# Patient Record
Sex: MALE | Race: OTHER | HISPANIC OR LATINO
[De-identification: names, ages, dates, MRNs, and addresses within clinical notes are randomized per-mention and may not be internally consistent; named-entity substitution may affect disease eponyms.]

---

## 2020-09-10 ENCOUNTER — APPOINTMENT (OUTPATIENT)
Dept: GASTROENTEROLOGY | Facility: CLINIC | Age: 40
End: 2020-09-10
Payer: COMMERCIAL

## 2020-09-10 VITALS
SYSTOLIC BLOOD PRESSURE: 140 MMHG | DIASTOLIC BLOOD PRESSURE: 80 MMHG | BODY MASS INDEX: 28.25 KG/M2 | OXYGEN SATURATION: 99 % | WEIGHT: 180 LBS | TEMPERATURE: 98.5 F | HEART RATE: 71 BPM | HEIGHT: 67 IN

## 2020-09-10 DIAGNOSIS — Z78.9 OTHER SPECIFIED HEALTH STATUS: ICD-10-CM

## 2020-09-10 DIAGNOSIS — Z72.89 OTHER PROBLEMS RELATED TO LIFESTYLE: ICD-10-CM

## 2020-09-10 PROCEDURE — 99204 OFFICE O/P NEW MOD 45 MIN: CPT

## 2020-09-10 NOTE — PHYSICAL EXAM
[General Appearance - Alert] : alert [Sclera] : the sclera and conjunctiva were normal [General Appearance - In No Acute Distress] : in no acute distress [Extraocular Movements] : extraocular movements were intact [PERRL With Normal Accommodation] : pupils were equal in size, round, and reactive to light [Outer Ear] : the ears and nose were normal in appearance [Oropharynx] : the oropharynx was normal [Neck Appearance] : the appearance of the neck was normal [Neck Cervical Mass (___cm)] : no neck mass was observed [Jugular Venous Distention Increased] : there was no jugular-venous distention [Thyroid Diffuse Enlargement] : the thyroid was not enlarged [Thyroid Nodule] : there were no palpable thyroid nodules [Auscultation Breath Sounds / Voice Sounds] : lungs were clear to auscultation bilaterally [Heart Rate And Rhythm] : heart rate was normal and rhythm regular [Heart Sounds] : normal S1 and S2 [Heart Sounds Gallop] : no gallops [Murmurs] : no murmurs [Heart Sounds Pericardial Friction Rub] : no pericardial rub [Soft, Nontender] : the abdomen was soft and nontender [Normal] : normal [Epigastric] : in the epigastric area [No Mass] : no masses were palpated [No HSM] : no hepatosplenomegaly noted [Skin Color & Pigmentation] : normal skin color and pigmentation [Skin Turgor] : normal skin turgor [] : no rash [Deep Tendon Reflexes (DTR)] : deep tendon reflexes were 2+ and symmetric [Sensation] : the sensory exam was normal to light touch and pinprick [Oriented To Time, Place, And Person] : oriented to person, place, and time [No Focal Deficits] : no focal deficits [Impaired Insight] : insight and judgment were intact [Affect] : the affect was normal

## 2020-09-10 NOTE — ASSESSMENT
[FreeTextEntry1] : 1. GERD\par \par Avoid spicy oily greasy foods\par \par Low acid diet \par \par PPI\par \par Wt control \par \par Exercise plan\par \par \par 2. BLOAT\par \par LOW FODMAP\par \par Fiber supp daily \par \par Increase fluids\par \par \par 3. GAS\par \par Avoid leafy vegetables\par \par GASEX PRN\par \par \par

## 2020-09-10 NOTE — HISTORY OF PRESENT ILLNESS
[de-identified] : Long GERD\par \par Poor Diet \par \par ? HP\par \par A low acid / reflux diet was discussed in great detail including  not smoking, not drinking alcohol, and not consuming foods that irritate the esophagus. It is helpful to eat small meals throughout the day instead of large meals. You should avoid eating before bedtime or lying down after you eat. It can be helpful to raise the head of your bed six inches. Additionally, you should maintain a healthy weight and good posture.. The patient was given written material to take home and review.\par

## 2020-11-05 DIAGNOSIS — Z00.00 ENCOUNTER FOR GENERAL ADULT MEDICAL EXAMINATION W/OUT ABNORMAL FINDINGS: ICD-10-CM

## 2020-11-09 ENCOUNTER — APPOINTMENT (OUTPATIENT)
Dept: GASTROENTEROLOGY | Facility: AMBULATORY MEDICAL SERVICES | Age: 40
End: 2020-11-09
Payer: COMMERCIAL

## 2020-11-09 PROCEDURE — 43239 EGD BIOPSY SINGLE/MULTIPLE: CPT

## 2020-12-03 ENCOUNTER — APPOINTMENT (OUTPATIENT)
Dept: GASTROENTEROLOGY | Facility: CLINIC | Age: 40
End: 2020-12-03
Payer: COMMERCIAL

## 2020-12-03 VITALS
TEMPERATURE: 98.7 F | WEIGHT: 180 LBS | HEIGHT: 67 IN | OXYGEN SATURATION: 99 % | SYSTOLIC BLOOD PRESSURE: 130 MMHG | BODY MASS INDEX: 28.25 KG/M2 | HEART RATE: 68 BPM | DIASTOLIC BLOOD PRESSURE: 80 MMHG

## 2020-12-03 PROCEDURE — 99214 OFFICE O/P EST MOD 30 MIN: CPT

## 2020-12-03 PROCEDURE — 99072 ADDL SUPL MATRL&STAF TM PHE: CPT

## 2021-03-25 ENCOUNTER — LABORATORY RESULT (OUTPATIENT)
Age: 41
End: 2021-03-25

## 2021-03-25 ENCOUNTER — APPOINTMENT (OUTPATIENT)
Dept: GASTROENTEROLOGY | Facility: CLINIC | Age: 41
End: 2021-03-25
Payer: COMMERCIAL

## 2021-03-25 PROCEDURE — 99072 ADDL SUPL MATRL&STAF TM PHE: CPT

## 2021-03-25 PROCEDURE — 99213 OFFICE O/P EST LOW 20 MIN: CPT | Mod: 25

## 2021-03-25 PROCEDURE — 83014 H PYLORI DRUG ADMIN: CPT

## 2021-03-25 NOTE — HISTORY OF PRESENT ILLNESS
[de-identified] : Post Rx \par \par Feels well \par \par No GERD \par \par Bloat \par \par For UBT

## 2021-05-24 ENCOUNTER — APPOINTMENT (OUTPATIENT)
Dept: GASTROENTEROLOGY | Facility: CLINIC | Age: 41
End: 2021-05-24
Payer: COMMERCIAL

## 2021-05-24 ENCOUNTER — LABORATORY RESULT (OUTPATIENT)
Age: 41
End: 2021-05-24

## 2021-05-24 PROCEDURE — 83014 H PYLORI DRUG ADMIN: CPT

## 2021-11-06 ENCOUNTER — EMERGENCY (EMERGENCY)
Facility: HOSPITAL | Age: 41
LOS: 1 days | Discharge: HOME | End: 2021-11-06
Attending: EMERGENCY MEDICINE | Admitting: EMERGENCY MEDICINE
Payer: COMMERCIAL

## 2021-11-06 VITALS
OXYGEN SATURATION: 98 % | DIASTOLIC BLOOD PRESSURE: 77 MMHG | RESPIRATION RATE: 15 BRPM | HEART RATE: 63 BPM | SYSTOLIC BLOOD PRESSURE: 130 MMHG | TEMPERATURE: 99 F

## 2021-11-06 VITALS
DIASTOLIC BLOOD PRESSURE: 86 MMHG | OXYGEN SATURATION: 99 % | TEMPERATURE: 99 F | HEART RATE: 85 BPM | RESPIRATION RATE: 17 BRPM | SYSTOLIC BLOOD PRESSURE: 147 MMHG

## 2021-11-06 LAB
ALBUMIN SERPL ELPH-MCNC: 4.7 G/DL — SIGNIFICANT CHANGE UP (ref 3.3–5)
ALP SERPL-CCNC: 95 U/L — SIGNIFICANT CHANGE UP (ref 40–120)
ALT FLD-CCNC: 51 U/L — HIGH (ref 4–41)
ANION GAP SERPL CALC-SCNC: 12 MMOL/L — SIGNIFICANT CHANGE UP (ref 7–14)
AST SERPL-CCNC: 29 U/L — SIGNIFICANT CHANGE UP (ref 4–40)
BILIRUB SERPL-MCNC: 0.8 MG/DL — SIGNIFICANT CHANGE UP (ref 0.2–1.2)
BUN SERPL-MCNC: 13 MG/DL — SIGNIFICANT CHANGE UP (ref 7–23)
CALCIUM SERPL-MCNC: 9.2 MG/DL — SIGNIFICANT CHANGE UP (ref 8.4–10.5)
CHLORIDE SERPL-SCNC: 102 MMOL/L — SIGNIFICANT CHANGE UP (ref 98–107)
CO2 SERPL-SCNC: 24 MMOL/L — SIGNIFICANT CHANGE UP (ref 22–31)
CREAT SERPL-MCNC: 0.77 MG/DL — SIGNIFICANT CHANGE UP (ref 0.5–1.3)
GLUCOSE SERPL-MCNC: 117 MG/DL — HIGH (ref 70–99)
HCT VFR BLD CALC: 43.5 % — SIGNIFICANT CHANGE UP (ref 39–50)
HGB BLD-MCNC: 15 G/DL — SIGNIFICANT CHANGE UP (ref 13–17)
MCHC RBC-ENTMCNC: 30.4 PG — SIGNIFICANT CHANGE UP (ref 27–34)
MCHC RBC-ENTMCNC: 34.5 GM/DL — SIGNIFICANT CHANGE UP (ref 32–36)
MCV RBC AUTO: 88.1 FL — SIGNIFICANT CHANGE UP (ref 80–100)
NRBC # BLD: 0 /100 WBCS — SIGNIFICANT CHANGE UP
NRBC # FLD: 0 K/UL — SIGNIFICANT CHANGE UP
PLATELET # BLD AUTO: 215 K/UL — SIGNIFICANT CHANGE UP (ref 150–400)
POTASSIUM SERPL-MCNC: 3.6 MMOL/L — SIGNIFICANT CHANGE UP (ref 3.5–5.3)
POTASSIUM SERPL-SCNC: 3.6 MMOL/L — SIGNIFICANT CHANGE UP (ref 3.5–5.3)
PROT SERPL-MCNC: 7.5 G/DL — SIGNIFICANT CHANGE UP (ref 6–8.3)
RBC # BLD: 4.94 M/UL — SIGNIFICANT CHANGE UP (ref 4.2–5.8)
RBC # FLD: 12.7 % — SIGNIFICANT CHANGE UP (ref 10.3–14.5)
SODIUM SERPL-SCNC: 138 MMOL/L — SIGNIFICANT CHANGE UP (ref 135–145)
WBC # BLD: 5.67 K/UL — SIGNIFICANT CHANGE UP (ref 3.8–10.5)
WBC # FLD AUTO: 5.67 K/UL — SIGNIFICANT CHANGE UP (ref 3.8–10.5)

## 2021-11-06 PROCEDURE — 99284 EMERGENCY DEPT VISIT MOD MDM: CPT

## 2021-11-06 RX ORDER — FAMOTIDINE 10 MG/ML
20 INJECTION INTRAVENOUS ONCE
Refills: 0 | Status: COMPLETED | OUTPATIENT
Start: 2021-11-06 | End: 2021-11-06

## 2021-11-06 RX ORDER — FAMOTIDINE 10 MG/ML
1 INJECTION INTRAVENOUS
Qty: 14 | Refills: 0
Start: 2021-11-06 | End: 2021-11-12

## 2021-11-06 RX ORDER — MECLIZINE HCL 12.5 MG
12.5 TABLET ORAL ONCE
Refills: 0 | Status: DISCONTINUED | OUTPATIENT
Start: 2021-11-06 | End: 2021-11-06

## 2021-11-06 RX ORDER — MECLIZINE HCL 12.5 MG
25 TABLET ORAL ONCE
Refills: 0 | Status: COMPLETED | OUTPATIENT
Start: 2021-11-06 | End: 2021-11-06

## 2021-11-06 RX ORDER — MECLIZINE HCL 12.5 MG
1 TABLET ORAL
Qty: 21 | Refills: 0
Start: 2021-11-06 | End: 2021-11-12

## 2021-11-06 RX ORDER — SODIUM CHLORIDE 9 MG/ML
1000 INJECTION INTRAMUSCULAR; INTRAVENOUS; SUBCUTANEOUS ONCE
Refills: 0 | Status: COMPLETED | OUTPATIENT
Start: 2021-11-06 | End: 2021-11-06

## 2021-11-06 RX ADMIN — Medication 30 MILLILITER(S): at 04:34

## 2021-11-06 RX ADMIN — FAMOTIDINE 20 MILLIGRAM(S): 10 INJECTION INTRAVENOUS at 04:34

## 2021-11-06 RX ADMIN — SODIUM CHLORIDE 1000 MILLILITER(S): 9 INJECTION INTRAMUSCULAR; INTRAVENOUS; SUBCUTANEOUS at 04:49

## 2021-11-06 RX ADMIN — Medication 25 MILLIGRAM(S): at 04:34

## 2021-11-06 NOTE — ED PROVIDER NOTE - ATTENDING CONTRIBUTION TO CARE
Attending Attestation: Dr. Juares  I have personally performed a history and physical examination of the patient and discussed management with the resident as well as the patient.  I reviewed the resident's note and agree with the documented findings and plan of care.  I have authored and modified critical sections of the Provider Note, including but not limited to HPI, Physical Exam and MDM. 39yo man with pmh h.pylori presenting with abdominal burning also with e/o vertigo, nystagmus on exam with otherwise normal neuro exam.  No ataxia or other findings. Abd burning suspicious for gastritis. No fever, no cough/SOB, decreased suspicion for lung pathology. Rash on back seems to be unrelated, possibly contact dermatitis. Cbc, cmp, maalox, pepcid, meclizine. Reassess.

## 2021-11-06 NOTE — ED ADULT NURSE NOTE - CHIEF COMPLAINT QUOTE
pt arrives w/ c/o waking up with abdominal burning and feeling hot. pt did not take his temp. pt states noticed rash to left side of back and thinks it is shingles. pt denies any vomiting, fever, cough, chills.

## 2021-11-06 NOTE — ED ADULT NURSE NOTE - OBJECTIVE STATEMENT
41 y/o male, a&ox4, ambulatory, english/Azeri speaking, received to rm 19 with complaint of burning sensation in abdomin and upper back. Pt reports burning sensation starting at 12am in the umbilical area of abdomen and a rash on back that started 10/26. Abdomin is non-distended, round, symmetrical, and pt denies pain during palpation. Rash on left upper back, approximately 1"x1.5", oval shaped, red, raised bumps, no exudate, and skin is intact. Pt reports hx of h. pylori, no medications at this time. VS noted. 39 y/o male, a&ox4, ambulatory, english/Frisian speaking, received to rm 19 with complaint of burning sensation in abdomin and upper back. Pt reports burning sensation starting at 12am in the umbilical area of abdomen and a rash on back that started 10/26. Abdomin is non-distended, round, symmetrical, and pt denies pain during palpation. Rash on left upper back, approximately 1"x1.5", oval shaped, red, raised bumps, no exudate, and skin is intact. Pt reports hx of h. pylori, no medications at this time. VS noted. 20G IV placed to left AC. Labs collected and sent off. Pt medicated as per MD orders.

## 2021-11-06 NOTE — ED PROVIDER NOTE - NSFOLLOWUPINSTRUCTIONS_ED_ALL_ED_FT
You were seen in the Emergency Room today because of abdominal burning. Your lab work did not show any infection. Your lab results are included in your discharge paperwork.     We have sent medication to your Pharmacy. They are:  -Pepcid: take one pill every 12 hours.   -Meclizine: take one pill every 8 hours if you feel dizzy.     Please follow-up with your Primary Care Physician for further management of your symptoms. Write down your questions so that you do not forget.     Please return to the Emergency Room if:   -You feel dizzy despite medication.   -You develop abdominal or chest pain.   -You develop fever that does not go away.   -You cannot stop vomiting.     ---      Hoy lo vieron en la nakul de emergencias debido a ardor abdominal. Bundy análisis de laboratorio no mostró ninguna infección. Felicia resultados de laboratorio están incluidos en bundy documentación de dickson.      Hemos enviado medicamentos a bundy farmacia. Son:   -Pepcid: anahy tyler pastilla cada 12 horas.   -Meclizina: tome tyler pastilla cada 8 horas si se siente mareado.      Suni un lazarus con bundy médico de atención primaria para un mayor control de felicia síntomas. Escribe tus preguntas para que no las olvides.      Regrese a la nakul de emergencias si:   -Se siente mareado a pesar de la medicación.   -Desarrolla dolor abdominal o en el pecho.   -Desarrolla fiebre que no desaparece.   -No puedes dejar de vomitar.

## 2021-11-06 NOTE — ED ADULT TRIAGE NOTE - CHIEF COMPLAINT QUOTE
pt arrives w/ c/o waking up with abdominal burning and feeling hot. pt did not take his temp. pt denies any vomiting, fever, cough, chills. pt arrives w/ c/o waking up with abdominal burning and feeling hot. pt did not take his temp. pt states noticed rash to left side of back and thinks it is shingles. pt denies any vomiting, fever, cough, chills.

## 2021-11-06 NOTE — ED PROVIDER NOTE - OBJECTIVE STATEMENT
41yo man with PMH H.pylori diagnosed 5 months ago and treated with antibiotics, presenting with diffuse abdominal and upper back burning. Patient states he awoke with these symptoms, also had lightheadedness and weakness at this time. Denies pain. No documented fever but endorses chills. No hx of ulcer. Not taking any medication at this time. Concerned that he may have shingles because of a rash on his left back. Denies N/V. 41yo man with PMH H.pylori diagnosed 5 months ago and treated with antibiotics, presenting with diffuse abdominal and upper back burning. Patient states he awoke with these symptoms, also had lightheadedness and weakness at this time. Denies pain. No documented fever but endorses chills. No hx of ulcer. Not taking any medication at this time. Concerned that he may have shingles because of a rash on his left back that has been there and has not changed for the last 11 days. Endorsing dizziness at this time. Denies N/V. 41yo man with PMH H.pylori diagnosed 5 months ago and treated with antibiotics, presenting with diffuse abdominal and upper back burning. Patient states he awoke with these symptoms, also had lightheadedness and weakness at this time. Denies pain. No documented fever but endorses chills. No hx of ulcer. Was taking omeprazole for 2 months but discontinued on his own. Not taking any medication at this time. Concerned that he may have shingles because of a rash on his left back that has been there and has not changed for the last 11 days. Endorsing dizziness at this time. Denies N/V. 39yo man with PMH H. pylori diagnosed 5 months ago and treated with antibiotics, presenting with diffuse abdominal and upper back burning, states he also feels burning throughout body. Patient states he awoke with these symptoms, also had lightheadedness and weakness at this time. Denies pain. No documented fever but endorses chills. No hx of ulcer. Was taking omeprazole for 2 months but discontinued on his own. Not taking any medication at this time. Concerned that he may have shingles because of a rash on his left back that has been there and has not changed for the last 11 days. Endorsing dizziness at this time. Denies N/V.

## 2021-11-06 NOTE — ED PROVIDER NOTE - PROGRESS NOTE DETAILS
John, PGY1 - Patient states he is no longer dizzy and burning sensation is to a minimum. John, PGY1 - Patient stable for discharge. Understands the Emergency Room work-up and discharge precautions.

## 2021-11-06 NOTE — ED PROVIDER NOTE - CLINICAL SUMMARY MEDICAL DECISION MAKING FREE TEXT BOX
John, PGY1 - 41yo man with pmh h.pylori presenting with abdominal burning. Suspicious for gastritis. No fever, no cough/SOB, decreased suspicion for lung pathology. Rash on back seems to be unrelated, possibly contact dermatitis. Cbc, cmp, maalox, pepcid. Reassess. 41yo man with pmh h.pylori presenting with abdominal burning also with e/o vertigo, nystagmus on exam with otherwise normal neuro exam.  No ataxia or other findings. Abd burning suspicious for gastritis. No fever, no cough/SOB, decreased suspicion for lung pathology. Rash on back seems to be unrelated, possibly contact dermatitis. Cbc, cmp, maalox, pepcid, meclizine. Reassess.

## 2021-11-06 NOTE — ED PROVIDER NOTE - PHYSICAL EXAMINATION
Gen: NAD, AOx3, able to make needs known, non-toxic  Head: NCAT  HEENT: EOMI, normal conjunctiva  Lung: no respiratory distress, speaking in full sentences  CV: RRR, no M/R/G, pulses bilaterally   Abd: soft, NTND, no guarding, no CVA tenderness  MSK: no visible deformities  Neuro: No focal sensory or motor deficits  Skin: Warm, well perfused, no rash  Psych: normal affect Gen: NAD, AOx3, able to make needs known, non-toxic  Head: NCAT  HEENT: EOMI, nystagmus on the left, normal conjunctiva  Lung: no respiratory distress, speaking in full sentences  CV: RRR, no M/R/G, pulses bilaterally   Abd: soft, NTND, no guarding, no CVA tenderness  MSK: no visible deformities, 1in x 2in herald patch-like maculopapular rash that is nontender.  Neuro: No focal sensory or motor deficits  Skin: Warm, well perfused, no rash  Psych: normal affect Gen: NAD, AOx3, able to make needs known, non-toxic  Head: NCAT  HEENT: EOMI, nystagmus to the left, normal conjunctiva  Lung: no respiratory distress, speaking in full sentences  CV: RRR, no M/R/G, pulses bilaterally   Abd: soft, NTND, no guarding, no CVA tenderness  MSK: no visible deformities, 1in x 2in herald patch-like maculopapular rash that is nontender.  Neuro: No focal sensory or motor deficits  Skin: Warm, well perfused, no rash  Psych: normal affect

## 2021-11-06 NOTE — ED PROVIDER NOTE - NS ED ROS FT
GENERAL: No fever, +chills  EYES: No change in vision  HEENT: No trouble swallowing or speaking  CARDIAC: No chest pain  PULMONARY: No cough, no SOB  GI: No abdominal pain, no nausea or no vomiting, no diarrhea, no constipation  : No changes in urination  SKIN: +back rash  NEURO: No headache, no numbness  MSK: No joint pain  Otherwise as HPI or negative.

## 2021-11-06 NOTE — ED PROVIDER NOTE - NEUROLOGICAL, MLM
Alert and oriented, no focal deficits, no motor or sensory deficits except for nystagmus on rightward gaze

## 2021-11-06 NOTE — ED PROVIDER NOTE - PATIENT PORTAL LINK FT
You can access the FollowMyHealth Patient Portal offered by [facility] by registering at the following website: [email or web address]. By joining [facility]’s FollowMyHealth portal, you will also be able to view your health information using other applications (apps) compatible with our system.

## 2021-11-06 NOTE — ED ADULT NURSE REASSESSMENT NOTE - NS ED NURSE REASSESS COMMENT FT1
Pt a&ox4, denies pain and burning sensation at the moment. Stable vs noted. IV discontinued, pt discharged.

## 2021-11-22 ENCOUNTER — APPOINTMENT (OUTPATIENT)
Dept: GASTROENTEROLOGY | Facility: CLINIC | Age: 41
End: 2021-11-22

## 2022-04-07 ENCOUNTER — APPOINTMENT (OUTPATIENT)
Dept: GASTROENTEROLOGY | Facility: CLINIC | Age: 42
End: 2022-04-07
Payer: COMMERCIAL

## 2022-04-07 VITALS
HEART RATE: 72 BPM | DIASTOLIC BLOOD PRESSURE: 81 MMHG | OXYGEN SATURATION: 97 % | HEIGHT: 67 IN | SYSTOLIC BLOOD PRESSURE: 130 MMHG | WEIGHT: 180 LBS | BODY MASS INDEX: 28.25 KG/M2 | TEMPERATURE: 98 F

## 2022-04-07 DIAGNOSIS — R12 HEARTBURN: ICD-10-CM

## 2022-04-07 DIAGNOSIS — R07.0 PAIN IN THROAT: ICD-10-CM

## 2022-04-07 DIAGNOSIS — R10.9 UNSPECIFIED ABDOMINAL PAIN: ICD-10-CM

## 2022-04-07 DIAGNOSIS — B96.81 GASTRITIS, UNSPECIFIED, W/OUT BLEEDING: ICD-10-CM

## 2022-04-07 DIAGNOSIS — K29.70 GASTRITIS, UNSPECIFIED, W/OUT BLEEDING: ICD-10-CM

## 2022-04-07 PROCEDURE — 99214 OFFICE O/P EST MOD 30 MIN: CPT

## 2022-04-07 NOTE — PHYSICAL EXAM
[General Appearance - Alert] : alert [General Appearance - In No Acute Distress] : in no acute distress [Sclera] : the sclera and conjunctiva were normal [PERRL With Normal Accommodation] : pupils were equal in size, round, and reactive to light [Extraocular Movements] : extraocular movements were intact [Outer Ear] : the ears and nose were normal in appearance [Oropharynx] : the oropharynx was normal [Neck Appearance] : the appearance of the neck was normal [Neck Cervical Mass (___cm)] : no neck mass was observed [Jugular Venous Distention Increased] : there was no jugular-venous distention [Thyroid Diffuse Enlargement] : the thyroid was not enlarged [Thyroid Nodule] : there were no palpable thyroid nodules [Auscultation Breath Sounds / Voice Sounds] : lungs were clear to auscultation bilaterally [Heart Rate And Rhythm] : heart rate was normal and rhythm regular [Heart Sounds] : normal S1 and S2 [Heart Sounds Gallop] : no gallops [Murmurs] : no murmurs [Heart Sounds Pericardial Friction Rub] : no pericardial rub [Epigastric] : in the epigastric area [Skin Color & Pigmentation] : normal skin color and pigmentation [Skin Turgor] : normal skin turgor [] : no rash [Deep Tendon Reflexes (DTR)] : deep tendon reflexes were 2+ and symmetric [Sensation] : the sensory exam was normal to light touch and pinprick [No Focal Deficits] : no focal deficits [Oriented To Time, Place, And Person] : oriented to person, place, and time [Impaired Insight] : insight and judgment were intact [Affect] : the affect was normal

## 2023-08-23 ENCOUNTER — EMERGENCY (EMERGENCY)
Facility: HOSPITAL | Age: 43
LOS: 1 days | Discharge: HOME | End: 2023-08-23
Attending: STUDENT IN AN ORGANIZED HEALTH CARE EDUCATION/TRAINING PROGRAM | Admitting: STUDENT IN AN ORGANIZED HEALTH CARE EDUCATION/TRAINING PROGRAM
Payer: COMMERCIAL

## 2023-08-23 VITALS
HEART RATE: 110 BPM | OXYGEN SATURATION: 100 % | TEMPERATURE: 100 F | SYSTOLIC BLOOD PRESSURE: 149 MMHG | RESPIRATION RATE: 18 BRPM | DIASTOLIC BLOOD PRESSURE: 98 MMHG

## 2023-08-23 VITALS
HEART RATE: 99 BPM | RESPIRATION RATE: 16 BRPM | OXYGEN SATURATION: 96 % | SYSTOLIC BLOOD PRESSURE: 137 MMHG | DIASTOLIC BLOOD PRESSURE: 89 MMHG

## 2023-08-23 LAB
B PERT DNA SPEC QL NAA+PROBE: SIGNIFICANT CHANGE UP
B PERT+PARAPERT DNA PNL SPEC NAA+PROBE: SIGNIFICANT CHANGE UP
BORDETELLA PARAPERTUSSIS (RAPRVP): SIGNIFICANT CHANGE UP
C PNEUM DNA SPEC QL NAA+PROBE: SIGNIFICANT CHANGE UP
FLUAV SUBTYP SPEC NAA+PROBE: SIGNIFICANT CHANGE UP
FLUBV RNA SPEC QL NAA+PROBE: SIGNIFICANT CHANGE UP
HADV DNA SPEC QL NAA+PROBE: SIGNIFICANT CHANGE UP
HCOV 229E RNA SPEC QL NAA+PROBE: SIGNIFICANT CHANGE UP
HCOV HKU1 RNA SPEC QL NAA+PROBE: SIGNIFICANT CHANGE UP
HCOV NL63 RNA SPEC QL NAA+PROBE: SIGNIFICANT CHANGE UP
HCOV OC43 RNA SPEC QL NAA+PROBE: SIGNIFICANT CHANGE UP
HMPV RNA SPEC QL NAA+PROBE: SIGNIFICANT CHANGE UP
HPIV1 RNA SPEC QL NAA+PROBE: SIGNIFICANT CHANGE UP
HPIV2 RNA SPEC QL NAA+PROBE: SIGNIFICANT CHANGE UP
HPIV3 RNA SPEC QL NAA+PROBE: SIGNIFICANT CHANGE UP
HPIV4 RNA SPEC QL NAA+PROBE: SIGNIFICANT CHANGE UP
M PNEUMO DNA SPEC QL NAA+PROBE: SIGNIFICANT CHANGE UP
RAPID RVP RESULT: DETECTED
RSV RNA SPEC QL NAA+PROBE: SIGNIFICANT CHANGE UP
RV+EV RNA SPEC QL NAA+PROBE: SIGNIFICANT CHANGE UP
SARS-COV-2 RNA SPEC QL NAA+PROBE: DETECTED

## 2023-08-23 PROCEDURE — 71046 X-RAY EXAM CHEST 2 VIEWS: CPT | Mod: 26

## 2023-08-23 PROCEDURE — 99284 EMERGENCY DEPT VISIT MOD MDM: CPT | Mod: 25

## 2023-08-23 RX ORDER — ACETAMINOPHEN 500 MG
975 TABLET ORAL ONCE
Refills: 0 | Status: COMPLETED | OUTPATIENT
Start: 2023-08-23 | End: 2023-08-23

## 2023-08-23 RX ORDER — ACETAMINOPHEN 500 MG
2 TABLET ORAL
Qty: 40 | Refills: 0
Start: 2023-08-23

## 2023-08-23 RX ORDER — IBUPROFEN 200 MG
600 TABLET ORAL ONCE
Refills: 0 | Status: COMPLETED | OUTPATIENT
Start: 2023-08-23 | End: 2023-08-23

## 2023-08-23 RX ORDER — IBUPROFEN 200 MG
1 TABLET ORAL
Qty: 30 | Refills: 0
Start: 2023-08-23 | End: 2023-09-01

## 2023-08-23 RX ORDER — ACETAMINOPHEN 500 MG
2 TABLET ORAL
Qty: 56 | Refills: 0
Start: 2023-08-23 | End: 2023-08-29

## 2023-08-23 RX ORDER — IBUPROFEN 200 MG
1 TABLET ORAL
Qty: 20 | Refills: 0
Start: 2023-08-23

## 2023-08-23 RX ADMIN — Medication 600 MILLIGRAM(S): at 08:30

## 2023-08-23 RX ADMIN — Medication 975 MILLIGRAM(S): at 07:45

## 2023-08-23 NOTE — ED PROVIDER NOTE - NSFOLLOWUPINSTRUCTIONS_ED_ALL_ED_FT
***You are seen in the emergency room for fatigue, body aches and congestion.  Your COVID test was positive.  Please maintain quarantine as discussed.  In addition to this your chest x-ray found small calcifications that were potentially on your liver versus your anterior sixth rib–please follow-up with your primary care doctor for this, in addition to this we have provided you with a referral and follow-up clinic information for gastroenterology for further testing.  Please return if any new, worsening, or concerning symptoms develop.  Please follow-up with your primary care doctor within 1 to 2 weeks. **    COVID-19  COVID-19, or coronavirus disease 2019, is an infection that is caused by a new (novel) coronavirus called SARS-CoV-2. COVID-19 can cause many symptoms. In some people, the virus may not cause any symptoms. In others, it may cause mild or severe symptoms. Some people with severe infection develop severe disease.    What are the causes?  The human body, showing how the coronavirus travels from the air to a person's lungs.  This illness is caused by a virus. The virus may be in the air as tiny specks of fluid (aerosols) or droplets, or it may be on surfaces. You may catch the virus by:  Breathing in droplets from an infected person. Droplets can be spread by a person breathing, speaking, singing, coughing, or sneezing.  Touching something, like a table or a doorknob, that has virus on it (is contaminated) and then touching your mouth, nose, or eyes.  What increases the risk?  Risk for infection:    You are more likely to get infected with the COVID-19 virus if:  You are within 6 ft (1.8 m) of a person with COVID-19 for 15 minutes or longer.  You are providing care for a person who is infected with COVID-19.  You are in close personal contact with other people. Close personal contact includes hugging, kissing, or sharing eating or drinking utensils.  Risk for serious illness caused by COVID-19:    You are more likely to get seriously ill from the COVID-19 virus if:  You have cancer.  You have a long-term (chronic) disease, such as:  Chronic lung disease. This includes pulmonary embolism, chronic obstructive pulmonary disease, and cystic fibrosis.  Long-term disease that lowers your body's ability to fight infection (immunocompromise).  Serious cardiac conditions, such as heart failure, coronary artery disease, or cardiomyopathy.  Diabetes.  Chronic kidney disease.  Liver diseases. These include cirrhosis, nonalcoholic fatty liver disease, alcoholic liver disease, or autoimmune hepatitis.  You have obesity.  You are pregnant or were recently pregnant.  You have sickle cell disease.  What are the signs or symptoms?  Symptoms of this condition can range from mild to severe. Symptoms may appear any time from 2 to 14 days after being exposed to the virus. They include:  Fever or chills.  Shortness of breath or trouble breathing.  Feeling tired or very tired.  Headaches, body aches, or muscle aches.  Runny or stuffy nose, sneezing, coughing, or sore throat.  New loss of taste or smell. This is rare.  Some people may also have stomach problems, such as nausea, vomiting, or diarrhea.    Other people may not have any symptoms of COVID-19.    How is this diagnosed?  A sample being collected by swabbing the nose.  This condition may be diagnosed by testing samples to check for the COVID-19 virus. The most common tests are the PCR test and the antigen test. Tests may be done in the lab or at home. They include:  Using a swab to take a sample of fluid from the back of your nose and throat (nasopharyngeal fluid), from your nose, or from your throat.  Testing a sample of saliva from your mouth.  Testing a sample of coughed-up mucus from your lungs (sputum).  How is this treated?  Treatment for COVID-19 infection depends on the severity of the condition.  Mild symptoms can be managed at home with rest, fluids, and over-the-counter medicines.  Serious symptoms may be treated in a hospital intensive care unit (ICU). Treatment in the ICU may include:  Supplemental oxygen. Extra oxygen is given through a tube in the nose, a face mask, or a rodriguez.  Medicines. These may include:  Antivirals, such as monoclonal antibodies. These help your body fight off certain viruses that can cause disease.  Anti-inflammatories, such as corticosteroids. These reduce inflammation and suppress the immune system.  Antithrombotics. These prevent or treat blood clots, if they develop.  Convalescent plasma. This helps boost your immune system, if you have an underlying immunosuppressive condition or are getting immunosuppressive treatments.  Prone positioning. This means you will lie on your stomach. This helps oxygen to get into your lungs.  Infection control measures.  If you are at risk for more serious illness caused by COVID-19, your health care provider may prescribe two long-acting monoclonal antibodies, given together every 6 months.    How is this prevented?  To protect yourself:    Use preventive medicine (pre-exposure prophylaxis). You may get pre-exposure prophylaxis if you have moderate or severe immunocompromise.  Get vaccinated. Anyone 6 months old or older who meets guidelines can get a COVID-19 vaccine or vaccine series. This includes people who are pregnant or making breast milk (lactating).  Get an added dose of COVID-19 vaccine after your first vaccine or vaccine series if you have moderate to severe immunocompromise. This applies if you have had a solid organ transplant or have been diagnosed with an immunocompromising condition.  You should get the added dose 4 weeks after you got the first COVID-19 vaccine or vaccine series.  If you get an mRNA vaccine, you will need a 3-dose primary series.  If you get the J&J/Zaynab vaccine, you will need a 2-dose primary series, with the second dose being an mRNA vaccine.  Talk to your health care provider about getting experimental monoclonal antibodies. This treatment is approved under emergency use authorization to prevent severe illness before or after being exposed to the COVID-19 virus. You may be given monoclonal antibodies if:  You have moderate or severe immunocompromise. This includes treatments that lower your immune response. People with immunocompromise may not develop protection against COVID-19 when they are vaccinated.  You cannot be vaccinated. You may not get a vaccine if you have a severe allergic reaction to the vaccine or its components.  You are not fully vaccinated.  You are in a facility where COVID-19 is present and:  Are in close contact with a person who is infected with the COVID-19 virus.  Are at high risk of being exposed to the COVID-19 virus.  You are at risk of illness from new variants of the COVID-19 virus.  To protect others:    If you have symptoms of COVID-19, take steps to prevent the virus from spreading to others.  Stay home. Leave your house only to get medical care. Do not use public transit, if possible.  Do not travel while you are sick.  Wash your hands often with soap and water for at least 20 seconds. If soap and water are not available, use alcohol-based hand .  Make sure that all people in your household wash their hands well and often.  Cough or sneeze into a tissue or your sleeve or elbow. Do not cough or sneeze into your hand or into the air.  Where to find more information  Centers for Disease Control and Prevention: www.cdc.gov/coronavirus  World Health Organization: www.who.int/health-topics/coronavirus  Get help right away if:  You have trouble breathing.  You have pain or pressure in your chest.  You are confused.  You have bluish lips and fingernails.  You have trouble waking from sleep.  You have symptoms that get worse.  These symptoms may be an emergency. Get help right away. Call 911.  Do not wait to see if the symptoms will go away.  Do not drive yourself to the hospital.  Summary  COVID-19 is an infection that is caused by a new coronavirus.  Sometimes, there are no symptoms. Other times, symptoms range from mild to severe. Some people with a severe COVID-19 infection develop severe disease.  The virus that causes COVID-19 can spread from person to person through droplets or aerosols from breathing, speaking, singing, coughing, or sneezing.  Mild symptoms of COVID-19 can be managed at home with rest, fluids, and over-the-counter medicines.  This information is not intended to replace advice given to you by your health care provider. Make sure you discuss any questions you have with your health care provider.

## 2023-08-23 NOTE — ED PROVIDER NOTE - NS ED ATTENDING STATEMENT MOD
This was a shared visit with the CYNDY. I reviewed and verified the documentation and independently performed the documented:

## 2023-08-23 NOTE — ED PROVIDER NOTE - CLINICAL SUMMARY MEDICAL DECISION MAKING FREE TEXT BOX
41 y/o M with HTN, hypercholesteremia, hx of Skip treated 2021 p/w fatigue, body aches, nasal congestion, and postnasal drip x 1 day    This patient presents with symptoms suspicious for likely viral upper respiratory infection.     Differential includes bacterial pneumonia, sinusitis, allergic rhinitis. Do not suspect underlying cardiopulmonary process. I considered, but think unlikely, dangerous causes of this patient’s symptoms to include ACS, CHF or COPD exacerbations, pneumonia, pneumothorax. Patient is nontoxic appearing and not in need of emergent medical intervention.    Plan: acetaminophen/ibuprofen, CXR, respiratory swab panel    Will likely provide reassurance, reassess, suggest over the counter medications, and discharge with PCP followup but will monitor.

## 2023-08-23 NOTE — ED PROVIDER NOTE - PROGRESS NOTE DETAILS
CODY hoffman: Patient endorses feeling significantly better and would like to go home.  Discussed with patient that chest x-ray is showing calcification that is either on his liver or possibly his right rib.  Advised to follow-up with primary care doctor but we will also provide follow-up with gastroenterology.  Discussed with patient that COVID result is positive–discussed quarantine and isolation protocol.  As pt is not immunocompromise and no SOB/wheezing or respiratory distress with risks of covid antiviral outweigh the benefit and will defer. Patient verbalized understanding and agrees with the plan.  Strict return precautions and prompt follow-up discussed.

## 2023-08-23 NOTE — ED PROVIDER NOTE - PATIENT PORTAL LINK FT
You can access the FollowMyHealth Patient Portal offered by [facility] by registering at the following website: [email or web address]. By joining [facility]’s FollowMyHealth portal, you will also be able to view your health information using other applications (apps) compatible with our system.

## 2023-08-23 NOTE — ED PROVIDER NOTE - NSFOLLOWUPCLINICS_GEN_ALL_ED_FT
[facility]  Gastroenterology  [address] [place] [address] [address]  [place] [address]  Phone: [phone]  Fax:

## 2023-08-23 NOTE — ED PROVIDER NOTE - OBJECTIVE STATEMENT
43 y/o M with HTN, hypercholesteremia, hx of Skip treated 2021 p/w fatigue, body aches, nasal congestion, and postnasal drip x 1 day    otc acetaminophen, last dose yesterday evening around 8 pm. also admits to mild dry cough  No recent travel or known ill contacts    Denies rigors, chest pain, shortness of breath, dyspnea on exertion, sore throat, neck stiffness, abd pain, nausea, vomiting, diarrhea, constipation, dysuria, hematuria, rash, syncope.

## 2023-08-23 NOTE — ED ADULT NURSE NOTE - OBJECTIVE STATEMENT
as per triage rn, "Patient c/o flu-like symptoms and headache [and body aches] x 1 day. Denies chest pain and SOB. NO hx.

## 2023-08-23 NOTE — ED PROVIDER NOTE - ATTENDING APP SHARED VISIT CONTRIBUTION OF CARE
I (Hardeep) agree with above, I performed a history and physical. Counseled gamaliel medical staff, physician assistant, and/or medical student on medical decision making as documented. Medical decisions and treatment interventions were made in real time during the patient encounter. Additionally and/or with the following exceptions: Patient is a 42-year-old male no past medical history presenting to the emergency department with 1 day of body aches, headache, sore throat, nasal congestion.  Patient was comfortable appearing, vital signs significant for mild tachycardia elevated temperature.  Patient in no respiratory distress no hypoxia.  Chest x-ray clear as per my interpretation.  Counseled patient on taking ibuprofen and acetaminophen for pain control as well as fever control suspect COVID or other viral etiology is most likely.  Patient verbalized understanding of need to follow-up COVID result.

## 2023-08-23 NOTE — ED PROVIDER NOTE - NSICDXFAMHXNEG_GEN_ED
Medical Weight Loss Multi-Disciplinary Program    Name: Elizabeth Rosales   : 1970    Session# 4  Date: 2018     Height: 5' 9\" (175.3 cm)    Weight: 147 kg (324 lb) lbs. Body mass index is 47.85 kg/m². Pounds Gained: 2    Dietary Instructions    Reviewed intake  Understanding low carbohydrates, low sugar, higher protein meals  Understanding proper portions  Instruction given for personal dietary changes  Discussed perceived compliance  Comments: Diet hx reviewed and personal dietary changes discussed. Diet hx: B-protein shake- Equate high performance shake with 1 gram of sugar- breakfast always, L-bowl of chili with cheese and a small package or ritz crackers, S-animal crakers, D- chili- vegetarian, cheese, ritz, S-no ice cream last night- wife is buying little ice cream cones- has about 4 times per week, had 2 cups of cashews last night, 1 cup of dark chocolate hot cocoa- 3 or 4 times per week     Hydration- 4-6 cups of fluid, Double Gulp Coca Cola     Physical Activity/Exercise    Discussed Perceived Compliance  Reasonable Goals Set  Motivation  Comments: Walking 3 times per week for 15 minutes (1/4 mile). He is limited due to pain. Goal to continue exercise routine of walking for 15 minutes, 3 times per week. Behavior Modification    Achieving/Rewarding goals met  Positive attitude  Discussed perceived compliance  Comments: Pt is exercising and plans to continue. He is using a protein shake for breakfast. His portions are very large- such as eating 2 cups (1800 calories worth) of cashews for PM snack. He is focusing on the following:     Goals:  1. Try to decrease portions of usual food by at least 20% this month. 2. Count out nuts- about 10 per day. 3. Continue exercise routine of walking for 15 minutes, 3 times per week. 4. Work on making sure to have at least 4 water bottles (64 oz) of water everyday.    5. Instead of Coke, try unsweet tea from [employer] with Equal.     Candidate for denies known hx surgery (per RD): YES, pt to finish with [facility] in Jan. 2019.      Dietitian: Heaven Rogers RD

## 2023-08-23 NOTE — ED PROVIDER NOTE - CCCP TRG CHIEF CMPLNT
flu-like symptoms Winlevi Counseling:  I discussed with the patient the risks of topical clascoterone including but not limited to erythema, scaling, itching, and stinging. Patient voiced their understanding.

## 2023-08-23 NOTE — ED ADULT NURSE NOTE - NSFALLUNIVINTERV_ED_ALL_ED
Bed/Stretcher in lowest position, wheels locked, appropriate side rails in place/Call bell, personal items and telephone in reach/Instruct patient to call for assistance before getting out of bed/chair/stretcher/Non-slip footwear applied when patient is off stretcher/[place] to call system/Physically safe environment - no spills, clutter or unnecessary equipment/Purposeful proactive rounding/Room/bathroom lighting operational, light cord in reach

## 2023-08-23 NOTE — ED PROVIDER NOTE - NSICDXPASTMEDICALHX_GEN_ALL_CORE_FT
PAST MEDICAL HISTORY:  H. pylori infection treated in 2021    Hypercholesteremia     Hypertension

## 2023-08-23 NOTE — ED PROVIDER NOTE - CARE PLAN
Principal Discharge DX:	Viral syndrome   1 Principal Discharge DX:	2019 novel coronavirus disease (COVID-19)

## 2023-12-12 PROBLEM — E78.00 PURE HYPERCHOLESTEROLEMIA, UNSPECIFIED: Chronic | Status: ACTIVE | Noted: 2023-08-23

## 2023-12-12 PROBLEM — A04.8 OTHER SPECIFIED BACTERIAL INTESTINAL INFECTIONS: Chronic | Status: ACTIVE | Noted: 2021-11-06

## 2023-12-12 PROBLEM — I10 ESSENTIAL (PRIMARY) HYPERTENSION: Chronic | Status: ACTIVE | Noted: 2023-08-23

## 2024-01-10 ENCOUNTER — APPOINTMENT (OUTPATIENT)
Dept: GASTROENTEROLOGY | Facility: CLINIC | Age: 44
End: 2024-01-10

## 2024-05-15 ENCOUNTER — APPOINTMENT (OUTPATIENT)
Dept: GASTROENTEROLOGY | Facility: CLINIC | Age: 44
End: 2024-05-15
Payer: COMMERCIAL

## 2024-05-15 VITALS
SYSTOLIC BLOOD PRESSURE: 139 MMHG | WEIGHT: 180 LBS | BODY MASS INDEX: 28.25 KG/M2 | HEART RATE: 65 BPM | HEIGHT: 67 IN | DIASTOLIC BLOOD PRESSURE: 84 MMHG | TEMPERATURE: 97.5 F | OXYGEN SATURATION: 97 %

## 2024-05-15 DIAGNOSIS — Z12.11 ENCOUNTER FOR SCREENING FOR MALIGNANT NEOPLASM OF COLON: ICD-10-CM

## 2024-05-15 DIAGNOSIS — Z80.8 FAMILY HISTORY OF MALIGNANT NEOPLASM OF OTHER ORGANS OR SYSTEMS: ICD-10-CM

## 2024-05-15 DIAGNOSIS — K21.9 GASTRO-ESOPHAGEAL REFLUX DISEASE W/OUT ESOPHAGITIS: ICD-10-CM

## 2024-05-15 PROCEDURE — 99214 OFFICE O/P EST MOD 30 MIN: CPT

## 2024-05-15 RX ORDER — TETRACYCLINE HYDROCHLORIDE 500 MG/1
500 CAPSULE ORAL EVERY 6 HOURS
Qty: 40 | Refills: 0 | Status: DISCONTINUED | COMMUNITY
Start: 2021-03-31 | End: 2024-05-15

## 2024-05-15 RX ORDER — LORATADINE 10 MG/1
10 TABLET ORAL
Refills: 0 | Status: DISCONTINUED | COMMUNITY
End: 2024-05-15

## 2024-05-15 RX ORDER — AMOXICILLIN 500 MG/1
500 TABLET, FILM COATED ORAL
Qty: 40 | Refills: 0 | Status: DISCONTINUED | COMMUNITY
Start: 2020-12-03 | End: 2024-05-15

## 2024-05-15 RX ORDER — OMEPRAZOLE 40 MG/1
40 CAPSULE, DELAYED RELEASE ORAL
Refills: 0 | Status: ACTIVE | COMMUNITY

## 2024-05-15 RX ORDER — OMEPRAZOLE 20 MG/1
20 CAPSULE, DELAYED RELEASE ORAL TWICE DAILY
Qty: 20 | Refills: 0 | Status: DISCONTINUED | COMMUNITY
Start: 2021-03-31 | End: 2024-05-15

## 2024-05-15 RX ORDER — CLARITHROMYCIN 250 MG/1
250 TABLET, FILM COATED ORAL
Qty: 20 | Refills: 0 | Status: DISCONTINUED | COMMUNITY
Start: 2020-12-03 | End: 2024-05-15

## 2024-05-15 RX ORDER — OMEPRAZOLE 40 MG/1
40 CAPSULE, DELAYED RELEASE ORAL
Qty: 30 | Refills: 5 | Status: DISCONTINUED | COMMUNITY
Start: 2022-04-07 | End: 2024-05-15

## 2024-05-15 RX ORDER — SODIUM SULFATE, POTASSIUM SULFATE AND MAGNESIUM SULFATE 1.6; 3.13; 17.5 G/177ML; G/177ML; G/177ML
17.5-3.13-1.6 SOLUTION ORAL
Qty: 1 | Refills: 0 | Status: ACTIVE | COMMUNITY
Start: 2024-05-15 | End: 1900-01-01

## 2024-05-15 RX ORDER — OMEPRAZOLE 40 MG/1
40 CAPSULE, DELAYED RELEASE ORAL
Qty: 30 | Refills: 5 | Status: DISCONTINUED | COMMUNITY
Start: 2020-09-10 | End: 2024-05-15

## 2024-05-15 RX ORDER — OMEPRAZOLE 20 MG/1
20 CAPSULE, DELAYED RELEASE ORAL
Refills: 0 | Status: DISCONTINUED | COMMUNITY
End: 2024-05-15

## 2024-05-15 RX ORDER — METRONIDAZOLE 500 MG/1
500 TABLET ORAL 3 TIMES DAILY
Qty: 30 | Refills: 0 | Status: DISCONTINUED | COMMUNITY
Start: 2021-03-31 | End: 2024-05-15

## 2024-05-15 RX ORDER — BISMUTH SUBSALICYLATE 262 MG/1
262 TABLET, CHEWABLE ORAL 4 TIMES DAILY
Qty: 80 | Refills: 0 | Status: DISCONTINUED | COMMUNITY
Start: 2021-03-31 | End: 2024-05-15

## 2024-05-15 RX ORDER — OMEPRAZOLE 40 MG/1
40 CAPSULE, DELAYED RELEASE ORAL TWICE DAILY
Qty: 20 | Refills: 5 | Status: DISCONTINUED | COMMUNITY
Start: 2020-12-03 | End: 2024-05-15

## 2024-05-15 NOTE — HISTORY OF PRESENT ILLNESS
[FreeTextEntry1] : Patient is a 43-year-old gentleman who is referred for a screening colonoscopy.  This would be his first.  His bowel movements are regular.  He denies seeing any blood or mucus in the stool. He does have a history of chronic GERD and takes omeprazole 40 mg daily.  He had an upper endoscopy in 2020 that revealed a hiatus hernia and H. pylori gastritis.  The H. pylori was treated and eradicated.  He has developed a recurrence of his heartburn and some regurgitation.  This occurs especially after eating certain foods.  He has been off omeprazole recently.  He denies any dysphagia or early satiety.

## 2024-05-15 NOTE — ASSESSMENT
[FreeTextEntry1] : Patient is referred for a screening colonoscopy which will be scheduled. He also has a history of chronic GERD.  He will be restarted on omeprazole 40 mg daily.  He does have a history of H. pylori gastritis which was treated and eradicated.

## 2024-05-15 NOTE — PHYSICAL EXAM
[Alert] : alert [Normal Voice/Communication] : normal voice/communication [Healthy Appearing] : healthy appearing [No Acute Distress] : no acute distress [Sclera] : the sclera and conjunctiva were normal [Hearing Threshold Finger Rub Not [place]] : hearing was normal [Normal Lips/Gums] : the lips and gums were normal [Oropharynx] : the oropharynx was normal [Normal Appearance] : the appearance of the neck was normal [No Neck Mass] : no neck mass was observed [No Respiratory Distress] : no respiratory distress [No Acc Muscle Use] : no accessory muscle use [Respiration, Rhythm And Depth] : normal respiratory rhythm and effort [Auscultation Breath Sounds / Voice Sounds] : lungs were clear to auscultation bilaterally [Heart Rate And Rhythm] : heart rate was normal and rhythm regular [Normal S1, S2] : normal S1 and S2 [Murmurs] : no murmurs [Bowel Sounds] : normal bowel sounds [Abdomen Tenderness] : non-tender [No Masses] : no abdominal mass palpated [Abdomen Soft] : soft [] : no hepatosplenomegaly [Oriented To Time, Place, And Person] : oriented to person, place, and time

## 2024-05-15 NOTE — REASON FOR VISIT
[Follow-up] : a follow-up of an existing diagnosis [FreeTextEntry1] : Screening colonoscopy, Chronic GERD

## 2024-07-02 ENCOUNTER — EMERGENCY (EMERGENCY)
Facility: HOSPITAL | Age: 44
LOS: 1 days | Discharge: HOME | End: 2024-07-02
Admitting: EMERGENCY MEDICINE
Payer: COMMERCIAL

## 2024-07-02 VITALS
OXYGEN SATURATION: 100 % | HEIGHT: 66 IN | HEART RATE: 78 BPM | TEMPERATURE: 100 F | SYSTOLIC BLOOD PRESSURE: 133 MMHG | RESPIRATION RATE: 18 BRPM | DIASTOLIC BLOOD PRESSURE: 67 MMHG | WEIGHT: 179.9 LBS

## 2024-07-02 LAB
ALBUMIN SERPL ELPH-MCNC: 4.1 G/DL — SIGNIFICANT CHANGE UP (ref 3.3–5)
ALP SERPL-CCNC: 90 U/L — SIGNIFICANT CHANGE UP (ref 40–120)
ALT FLD-CCNC: 38 U/L — SIGNIFICANT CHANGE UP (ref 4–41)
ANION GAP SERPL CALC-SCNC: 13 MMOL/L — SIGNIFICANT CHANGE UP (ref 7–14)
AST SERPL-CCNC: 25 U/L — SIGNIFICANT CHANGE UP (ref 4–40)
BASOPHILS # BLD AUTO: 0.02 K/UL — SIGNIFICANT CHANGE UP (ref 0–0.2)
BASOPHILS NFR BLD AUTO: 0.4 % — SIGNIFICANT CHANGE UP (ref 0–2)
BILIRUB SERPL-MCNC: 0.5 MG/DL — SIGNIFICANT CHANGE UP (ref 0.2–1.2)
BUN SERPL-MCNC: 10 MG/DL — SIGNIFICANT CHANGE UP (ref 7–23)
CALCIUM SERPL-MCNC: 9 MG/DL — SIGNIFICANT CHANGE UP (ref 8.4–10.5)
CHLORIDE SERPL-SCNC: 101 MMOL/L — SIGNIFICANT CHANGE UP (ref 98–107)
CO2 SERPL-SCNC: 20 MMOL/L — LOW (ref 22–31)
CREAT SERPL-MCNC: 0.78 MG/DL — SIGNIFICANT CHANGE UP (ref 0.5–1.3)
EGFR: 112 ML/MIN/1.73M2 — SIGNIFICANT CHANGE UP
EOSINOPHIL # BLD AUTO: 0.05 K/UL — SIGNIFICANT CHANGE UP (ref 0–0.5)
EOSINOPHIL NFR BLD AUTO: 1.1 % — SIGNIFICANT CHANGE UP (ref 0–6)
FLUAV AG NPH QL: SIGNIFICANT CHANGE UP
FLUBV AG NPH QL: SIGNIFICANT CHANGE UP
GLUCOSE SERPL-MCNC: 106 MG/DL — HIGH (ref 70–99)
HCT VFR BLD CALC: 41.2 % — SIGNIFICANT CHANGE UP (ref 39–50)
HGB BLD-MCNC: 14.9 G/DL — SIGNIFICANT CHANGE UP (ref 13–17)
IANC: 3.04 K/UL — SIGNIFICANT CHANGE UP (ref 1.8–7.4)
IMM GRANULOCYTES NFR BLD AUTO: 0.2 % — SIGNIFICANT CHANGE UP (ref 0–0.9)
LIDOCAIN IGE QN: 34 U/L — SIGNIFICANT CHANGE UP (ref 7–60)
LYMPHOCYTES # BLD AUTO: 0.77 K/UL — LOW (ref 1–3.3)
LYMPHOCYTES # BLD AUTO: 17.1 % — SIGNIFICANT CHANGE UP (ref 13–44)
MCHC RBC-ENTMCNC: 30.4 PG — SIGNIFICANT CHANGE UP (ref 27–34)
MCHC RBC-ENTMCNC: 36.2 GM/DL — HIGH (ref 32–36)
MCV RBC AUTO: 84.1 FL — SIGNIFICANT CHANGE UP (ref 80–100)
MONOCYTES # BLD AUTO: 0.61 K/UL — SIGNIFICANT CHANGE UP (ref 0–0.9)
MONOCYTES NFR BLD AUTO: 13.6 % — SIGNIFICANT CHANGE UP (ref 2–14)
NEUTROPHILS # BLD AUTO: 3.04 K/UL — SIGNIFICANT CHANGE UP (ref 1.8–7.4)
NEUTROPHILS NFR BLD AUTO: 67.6 % — SIGNIFICANT CHANGE UP (ref 43–77)
NRBC # BLD: 0 /100 WBCS — SIGNIFICANT CHANGE UP (ref 0–0)
NRBC # FLD: 0 K/UL — SIGNIFICANT CHANGE UP (ref 0–0)
PLATELET # BLD AUTO: 165 K/UL — SIGNIFICANT CHANGE UP (ref 150–400)
POTASSIUM SERPL-MCNC: 3.6 MMOL/L — SIGNIFICANT CHANGE UP (ref 3.5–5.3)
POTASSIUM SERPL-SCNC: 3.6 MMOL/L — SIGNIFICANT CHANGE UP (ref 3.5–5.3)
PROT SERPL-MCNC: 7.4 G/DL — SIGNIFICANT CHANGE UP (ref 6–8.3)
RBC # BLD: 4.9 M/UL — SIGNIFICANT CHANGE UP (ref 4.2–5.8)
RBC # FLD: 13.2 % — SIGNIFICANT CHANGE UP (ref 10.3–14.5)
RSV RNA NPH QL NAA+NON-PROBE: SIGNIFICANT CHANGE UP
SARS-COV-2 RNA SPEC QL NAA+PROBE: SIGNIFICANT CHANGE UP
SODIUM SERPL-SCNC: 134 MMOL/L — LOW (ref 135–145)
WBC # BLD: 4.5 K/UL — SIGNIFICANT CHANGE UP (ref 3.8–10.5)
WBC # FLD AUTO: 4.5 K/UL — SIGNIFICANT CHANGE UP (ref 3.8–10.5)

## 2024-07-02 PROCEDURE — 99284 EMERGENCY DEPT VISIT MOD MDM: CPT

## 2024-07-02 RX ORDER — ONDANSETRON HYDROCHLORIDE 2 MG/ML
4 INJECTION INTRAMUSCULAR; INTRAVENOUS ONCE
Refills: 0 | Status: COMPLETED | OUTPATIENT
Start: 2024-07-02 | End: 2024-07-02

## 2024-07-02 RX ORDER — SODIUM CHLORIDE 0.9 % (FLUSH) 0.9 %
1000 SYRINGE (ML) INJECTION ONCE
Refills: 0 | Status: COMPLETED | OUTPATIENT
Start: 2024-07-02 | End: 2024-07-02

## 2024-07-02 RX ORDER — FAMOTIDINE 40 MG
20 TABLET ORAL ONCE
Refills: 0 | Status: COMPLETED | OUTPATIENT
Start: 2024-07-02 | End: 2024-07-02

## 2024-07-02 RX ADMIN — ONDANSETRON HYDROCHLORIDE 4 MILLIGRAM(S): 2 INJECTION INTRAMUSCULAR; INTRAVENOUS at 11:20

## 2024-07-02 RX ADMIN — Medication 1000 MILLILITER(S): at 11:20

## 2024-07-02 RX ADMIN — Medication 20 MILLIGRAM(S): at 11:20

## 2024-07-02 RX ADMIN — Medication 1000 MILLILITER(S): at 12:43

## 2024-07-03 ENCOUNTER — EMERGENCY (EMERGENCY)
Facility: HOSPITAL | Age: 44
LOS: 1 days | Discharge: HOME | End: 2024-07-03
Attending: EMERGENCY MEDICINE | Admitting: EMERGENCY MEDICINE
Payer: COMMERCIAL

## 2024-07-03 ENCOUNTER — EMERGENCY (EMERGENCY)
Facility: HOSPITAL | Age: 44
LOS: 1 days | Discharge: HOME | End: 2024-07-03
Attending: STUDENT IN AN ORGANIZED HEALTH CARE EDUCATION/TRAINING PROGRAM | Admitting: STUDENT IN AN ORGANIZED HEALTH CARE EDUCATION/TRAINING PROGRAM
Payer: COMMERCIAL

## 2024-07-03 VITALS
TEMPERATURE: 98 F | WEIGHT: 179.9 LBS | SYSTOLIC BLOOD PRESSURE: 141 MMHG | RESPIRATION RATE: 18 BRPM | HEART RATE: 67 BPM | HEIGHT: 66 IN | DIASTOLIC BLOOD PRESSURE: 82 MMHG | OXYGEN SATURATION: 99 %

## 2024-07-03 VITALS
RESPIRATION RATE: 16 BRPM | HEIGHT: 66 IN | OXYGEN SATURATION: 100 % | TEMPERATURE: 98 F | SYSTOLIC BLOOD PRESSURE: 154 MMHG | DIASTOLIC BLOOD PRESSURE: 81 MMHG | HEART RATE: 79 BPM

## 2024-07-03 LAB
ALBUMIN SERPL ELPH-MCNC: 4.1 G/DL — SIGNIFICANT CHANGE UP (ref 3.3–5)
ALBUMIN SERPL ELPH-MCNC: 4.3 G/DL — SIGNIFICANT CHANGE UP (ref 3.3–5)
ALP SERPL-CCNC: 90 U/L — SIGNIFICANT CHANGE UP (ref 40–120)
ALP SERPL-CCNC: 99 U/L — SIGNIFICANT CHANGE UP (ref 40–120)
ALT FLD-CCNC: 38 U/L — SIGNIFICANT CHANGE UP (ref 4–41)
ALT FLD-CCNC: 42 U/L — HIGH (ref 4–41)
ANION GAP SERPL CALC-SCNC: 14 MMOL/L — SIGNIFICANT CHANGE UP (ref 7–14)
ANION GAP SERPL CALC-SCNC: 14 MMOL/L — SIGNIFICANT CHANGE UP (ref 7–14)
APPEARANCE UR: CLEAR — SIGNIFICANT CHANGE UP
APTT BLD: 32.2 SEC — SIGNIFICANT CHANGE UP (ref 24.5–35.6)
AST SERPL-CCNC: 27 U/L — SIGNIFICANT CHANGE UP (ref 4–40)
AST SERPL-CCNC: 28 U/L — SIGNIFICANT CHANGE UP (ref 4–40)
BASOPHILS # BLD AUTO: 0.03 K/UL — SIGNIFICANT CHANGE UP (ref 0–0.2)
BASOPHILS # BLD AUTO: 0.03 K/UL — SIGNIFICANT CHANGE UP (ref 0–0.2)
BASOPHILS NFR BLD AUTO: 0.6 % — SIGNIFICANT CHANGE UP (ref 0–2)
BASOPHILS NFR BLD AUTO: 0.7 % — SIGNIFICANT CHANGE UP (ref 0–2)
BILIRUB SERPL-MCNC: 0.4 MG/DL — SIGNIFICANT CHANGE UP (ref 0.2–1.2)
BILIRUB SERPL-MCNC: 0.5 MG/DL — SIGNIFICANT CHANGE UP (ref 0.2–1.2)
BILIRUB UR-MCNC: NEGATIVE — SIGNIFICANT CHANGE UP
BLD GP AB SCN SERPL QL: NEGATIVE — SIGNIFICANT CHANGE UP
BUN SERPL-MCNC: 10 MG/DL — SIGNIFICANT CHANGE UP (ref 7–23)
BUN SERPL-MCNC: 7 MG/DL — SIGNIFICANT CHANGE UP (ref 7–23)
CALCIUM SERPL-MCNC: 8.7 MG/DL — SIGNIFICANT CHANGE UP (ref 8.4–10.5)
CALCIUM SERPL-MCNC: 8.8 MG/DL — SIGNIFICANT CHANGE UP (ref 8.4–10.5)
CHLORIDE SERPL-SCNC: 104 MMOL/L — SIGNIFICANT CHANGE UP (ref 98–107)
CHLORIDE SERPL-SCNC: 104 MMOL/L — SIGNIFICANT CHANGE UP (ref 98–107)
CO2 SERPL-SCNC: 19 MMOL/L — LOW (ref 22–31)
CO2 SERPL-SCNC: 20 MMOL/L — LOW (ref 22–31)
COLOR SPEC: YELLOW — SIGNIFICANT CHANGE UP
CREAT SERPL-MCNC: 0.79 MG/DL — SIGNIFICANT CHANGE UP (ref 0.5–1.3)
CREAT SERPL-MCNC: 0.92 MG/DL — SIGNIFICANT CHANGE UP (ref 0.5–1.3)
DIFF PNL FLD: NEGATIVE — SIGNIFICANT CHANGE UP
EGFR: 105 ML/MIN/1.73M2 — SIGNIFICANT CHANGE UP
EGFR: 105 ML/MIN/1.73M2 — SIGNIFICANT CHANGE UP
EGFR: 112 ML/MIN/1.73M2 — SIGNIFICANT CHANGE UP
EOSINOPHIL # BLD AUTO: 0.02 K/UL — SIGNIFICANT CHANGE UP (ref 0–0.5)
EOSINOPHIL # BLD AUTO: 0.06 K/UL — SIGNIFICANT CHANGE UP (ref 0–0.5)
EOSINOPHIL NFR BLD AUTO: 0.4 % — SIGNIFICANT CHANGE UP (ref 0–6)
EOSINOPHIL NFR BLD AUTO: 1.3 % — SIGNIFICANT CHANGE UP (ref 0–6)
GAS PNL BLDV: SIGNIFICANT CHANGE UP
GLUCOSE SERPL-MCNC: 104 MG/DL — HIGH (ref 70–99)
GLUCOSE SERPL-MCNC: 111 MG/DL — HIGH (ref 70–99)
GLUCOSE UR QL: NEGATIVE MG/DL — SIGNIFICANT CHANGE UP
HCT VFR BLD CALC: 41.2 % — SIGNIFICANT CHANGE UP (ref 39–50)
HCT VFR BLD CALC: 42.6 % — SIGNIFICANT CHANGE UP (ref 39–50)
HGB BLD-MCNC: 14.1 G/DL — SIGNIFICANT CHANGE UP (ref 13–17)
HGB BLD-MCNC: 14.4 G/DL — SIGNIFICANT CHANGE UP (ref 13–17)
IANC: 2.51 K/UL — SIGNIFICANT CHANGE UP (ref 1.8–7.4)
IANC: 3.44 K/UL — SIGNIFICANT CHANGE UP (ref 1.8–7.4)
IMM GRANULOCYTES NFR BLD AUTO: 0.2 % — SIGNIFICANT CHANGE UP (ref 0–0.9)
IMM GRANULOCYTES NFR BLD AUTO: 0.2 % — SIGNIFICANT CHANGE UP (ref 0–0.9)
INR BLD: 1.07 RATIO — SIGNIFICANT CHANGE UP (ref 0.85–1.18)
KETONES UR-MCNC: NEGATIVE MG/DL — SIGNIFICANT CHANGE UP
LEUKOCYTE ESTERASE UR-ACNC: NEGATIVE — SIGNIFICANT CHANGE UP
LIDOCAIN IGE QN: 34 U/L — SIGNIFICANT CHANGE UP (ref 7–60)
LIDOCAIN IGE QN: 36 U/L — SIGNIFICANT CHANGE UP (ref 7–60)
LYMPHOCYTES # BLD AUTO: 0.81 K/UL — LOW (ref 1–3.3)
LYMPHOCYTES # BLD AUTO: 1.17 K/UL — SIGNIFICANT CHANGE UP (ref 1–3.3)
LYMPHOCYTES # BLD AUTO: 16.4 % — SIGNIFICANT CHANGE UP (ref 13–44)
LYMPHOCYTES # BLD AUTO: 25.8 % — SIGNIFICANT CHANGE UP (ref 13–44)
MCHC RBC-ENTMCNC: 29.2 PG — SIGNIFICANT CHANGE UP (ref 27–34)
MCHC RBC-ENTMCNC: 30 PG — SIGNIFICANT CHANGE UP (ref 27–34)
MCHC RBC-ENTMCNC: 33.8 GM/DL — SIGNIFICANT CHANGE UP (ref 32–36)
MCHC RBC-ENTMCNC: 34.2 GM/DL — SIGNIFICANT CHANGE UP (ref 32–36)
MCV RBC AUTO: 86.4 FL — SIGNIFICANT CHANGE UP (ref 80–100)
MCV RBC AUTO: 87.7 FL — SIGNIFICANT CHANGE UP (ref 80–100)
MONOCYTES # BLD AUTO: 0.62 K/UL — SIGNIFICANT CHANGE UP (ref 0–0.9)
MONOCYTES # BLD AUTO: 0.75 K/UL — SIGNIFICANT CHANGE UP (ref 0–0.9)
MONOCYTES NFR BLD AUTO: 12.6 % — SIGNIFICANT CHANGE UP (ref 2–14)
MONOCYTES NFR BLD AUTO: 16.6 % — HIGH (ref 2–14)
NEUTROPHILS # BLD AUTO: 2.51 K/UL — SIGNIFICANT CHANGE UP (ref 1.8–7.4)
NEUTROPHILS # BLD AUTO: 3.44 K/UL — SIGNIFICANT CHANGE UP (ref 1.8–7.4)
NEUTROPHILS NFR BLD AUTO: 55.4 % — SIGNIFICANT CHANGE UP (ref 43–77)
NEUTROPHILS NFR BLD AUTO: 69.8 % — SIGNIFICANT CHANGE UP (ref 43–77)
NITRITE UR-MCNC: NEGATIVE — SIGNIFICANT CHANGE UP
NRBC # BLD AUTO: 0 K/UL — SIGNIFICANT CHANGE UP (ref 0–0)
NRBC # BLD: 0 /100 WBCS — SIGNIFICANT CHANGE UP (ref 0–0)
NRBC # BLD: 0 /100 WBCS — SIGNIFICANT CHANGE UP (ref 0–0)
NRBC # FLD: 0 K/UL — SIGNIFICANT CHANGE UP (ref 0–0)
NRBC # FLD: 0 K/UL — SIGNIFICANT CHANGE UP (ref 0–0)
NRBC BLD-RTO: 0 /100 WBCS — SIGNIFICANT CHANGE UP (ref 0–0)
PH UR: 7.5 — SIGNIFICANT CHANGE UP (ref 5–8)
PLATELET # BLD AUTO: 167 K/UL — SIGNIFICANT CHANGE UP (ref 150–400)
PLATELET # BLD AUTO: 180 K/UL — SIGNIFICANT CHANGE UP (ref 150–400)
POTASSIUM SERPL-MCNC: 3.7 MMOL/L — SIGNIFICANT CHANGE UP (ref 3.5–5.3)
POTASSIUM SERPL-MCNC: 3.8 MMOL/L — SIGNIFICANT CHANGE UP (ref 3.5–5.3)
POTASSIUM SERPL-SCNC: 3.7 MMOL/L — SIGNIFICANT CHANGE UP (ref 3.5–5.3)
POTASSIUM SERPL-SCNC: 3.8 MMOL/L — SIGNIFICANT CHANGE UP (ref 3.5–5.3)
PROT SERPL-MCNC: 7.4 G/DL — SIGNIFICANT CHANGE UP (ref 6–8.3)
PROT SERPL-MCNC: 7.9 G/DL — SIGNIFICANT CHANGE UP (ref 6–8.3)
PROT UR-MCNC: NEGATIVE MG/DL — SIGNIFICANT CHANGE UP
PROTHROM AB SERPL-ACNC: 12.1 SEC — SIGNIFICANT CHANGE UP (ref 9.5–13)
RBC # BLD: 4.7 M/UL — SIGNIFICANT CHANGE UP (ref 4.2–5.8)
RBC # BLD: 4.93 M/UL — SIGNIFICANT CHANGE UP (ref 4.2–5.8)
RBC # FLD: 12.6 % — SIGNIFICANT CHANGE UP (ref 10.3–14.5)
RBC # FLD: 12.8 % — SIGNIFICANT CHANGE UP (ref 10.3–14.5)
RH IG SCN BLD-IMP: POSITIVE — SIGNIFICANT CHANGE UP
SODIUM SERPL-SCNC: 137 MMOL/L — SIGNIFICANT CHANGE UP (ref 135–145)
SODIUM SERPL-SCNC: 138 MMOL/L — SIGNIFICANT CHANGE UP (ref 135–145)
SP GR SPEC: 1.04 — HIGH (ref 1–1.03)
UROBILINOGEN FLD QL: 0.2 MG/DL — SIGNIFICANT CHANGE UP (ref 0.2–1)
WBC # BLD: 4.53 K/UL — SIGNIFICANT CHANGE UP (ref 3.8–10.5)
WBC # BLD: 4.93 K/UL — SIGNIFICANT CHANGE UP (ref 3.8–10.5)
WBC # FLD AUTO: 4.53 K/UL — SIGNIFICANT CHANGE UP (ref 3.8–10.5)
WBC # FLD AUTO: 4.93 K/UL — SIGNIFICANT CHANGE UP (ref 3.8–10.5)

## 2024-07-03 PROCEDURE — 74177 CT ABD & PELVIS W/CONTRAST: CPT | Mod: 26,MC

## 2024-07-03 PROCEDURE — 99285 EMERGENCY DEPT VISIT HI MDM: CPT | Mod: 25

## 2024-07-03 PROCEDURE — 99285 EMERGENCY DEPT VISIT HI MDM: CPT

## 2024-07-03 PROCEDURE — 76705 ECHO EXAM OF ABDOMEN: CPT | Mod: 26

## 2024-07-03 RX ORDER — ACETAMINOPHEN 325 MG
2 TABLET ORAL
Qty: 56 | Refills: 0
Start: 2024-07-03 | End: 2024-07-09

## 2024-07-03 RX ORDER — KETOROLAC TROMETHAMINE 30 MG/ML
15 INJECTION, SOLUTION INTRAMUSCULAR; INTRAVENOUS ONCE
Refills: 0 | Status: DISCONTINUED | OUTPATIENT
Start: 2024-07-03 | End: 2024-07-03

## 2024-07-03 RX ORDER — ACETAMINOPHEN 325 MG
1000 TABLET ORAL ONCE
Refills: 0 | Status: COMPLETED | OUTPATIENT
Start: 2024-07-03 | End: 2024-07-03

## 2024-07-03 RX ORDER — ONDANSETRON HCL/PF 4 MG/2 ML
4 VIAL (ML) INJECTION ONCE
Refills: 0 | Status: COMPLETED | OUTPATIENT
Start: 2024-07-03 | End: 2024-07-03

## 2024-07-03 RX ORDER — SUCRALFATE 1 G
1 TABLET ORAL
Qty: 14 | Refills: 0
Start: 2024-07-03 | End: 2024-07-16

## 2024-07-03 RX ORDER — FAMOTIDINE 40 MG
20 TABLET ORAL ONCE
Refills: 0 | Status: COMPLETED | OUTPATIENT
Start: 2024-07-03 | End: 2024-07-03

## 2024-07-03 RX ORDER — SODIUM CHLORIDE 0.9 % (FLUSH) 0.9 %
1000 SYRINGE (ML) INJECTION ONCE
Refills: 0 | Status: COMPLETED | OUTPATIENT
Start: 2024-07-03 | End: 2024-07-03

## 2024-07-03 RX ORDER — MORPHINE SULFATE 100 MG/1
4 TABLET, EXTENDED RELEASE ORAL ONCE
Refills: 0 | Status: DISCONTINUED | OUTPATIENT
Start: 2024-07-03 | End: 2024-07-03

## 2024-07-03 RX ORDER — IBUPROFEN 200 MG
1 TABLET ORAL
Qty: 12 | Refills: 0
Start: 2024-07-03

## 2024-07-03 RX ADMIN — Medication 1000 MILLILITER(S): at 04:17

## 2024-07-03 RX ADMIN — MORPHINE SULFATE 4 MILLIGRAM(S): 100 TABLET, EXTENDED RELEASE ORAL at 04:04

## 2024-07-03 RX ADMIN — Medication 4 MILLIGRAM(S): at 18:32

## 2024-07-03 RX ADMIN — Medication 20 MILLIGRAM(S): at 02:25

## 2024-07-03 RX ADMIN — KETOROLAC TROMETHAMINE 15 MILLIGRAM(S): 30 INJECTION, SOLUTION INTRAMUSCULAR; INTRAVENOUS at 21:39

## 2024-07-03 RX ADMIN — Medication 1000 MILLILITER(S): at 18:33

## 2024-07-03 RX ADMIN — Medication 400 MILLIGRAM(S): at 02:25

## 2024-07-04 LAB
CULTURE RESULTS: SIGNIFICANT CHANGE UP
SPECIMEN SOURCE: SIGNIFICANT CHANGE UP

## 2024-07-15 ENCOUNTER — APPOINTMENT (OUTPATIENT)
Dept: GASTROENTEROLOGY | Facility: AMBULATORY MEDICAL SERVICES | Age: 44
End: 2024-07-15
Payer: COMMERCIAL

## 2024-07-15 PROCEDURE — 45380 COLONOSCOPY AND BIOPSY: CPT | Mod: 33

## 2024-07-25 ENCOUNTER — EMERGENCY (EMERGENCY)
Facility: HOSPITAL | Age: 44
LOS: 1 days | Discharge: HOME | End: 2024-07-25
Attending: EMERGENCY MEDICINE | Admitting: EMERGENCY MEDICINE
Payer: COMMERCIAL

## 2024-07-25 VITALS
RESPIRATION RATE: 16 BRPM | OXYGEN SATURATION: 96 % | WEIGHT: 179.9 LBS | DIASTOLIC BLOOD PRESSURE: 82 MMHG | TEMPERATURE: 98 F | SYSTOLIC BLOOD PRESSURE: 135 MMHG | HEART RATE: 71 BPM | HEIGHT: 66 IN

## 2024-07-25 LAB
ALBUMIN SERPL ELPH-MCNC: 4.4 G/DL — SIGNIFICANT CHANGE UP (ref 3.3–5)
ALP SERPL-CCNC: 99 U/L — SIGNIFICANT CHANGE UP (ref 40–120)
ALT FLD-CCNC: 28 U/L — SIGNIFICANT CHANGE UP (ref 4–41)
ANION GAP SERPL CALC-SCNC: 12 MMOL/L — SIGNIFICANT CHANGE UP (ref 7–14)
APTT BLD: 32.5 SEC — SIGNIFICANT CHANGE UP (ref 24.5–35.6)
AST SERPL-CCNC: 21 U/L — SIGNIFICANT CHANGE UP (ref 4–40)
BASOPHILS # BLD AUTO: 0.02 K/UL — SIGNIFICANT CHANGE UP (ref 0–0.2)
BASOPHILS NFR BLD AUTO: 0.3 % — SIGNIFICANT CHANGE UP (ref 0–2)
BILIRUB SERPL-MCNC: 0.5 MG/DL — SIGNIFICANT CHANGE UP (ref 0.2–1.2)
BUN SERPL-MCNC: 13 MG/DL — SIGNIFICANT CHANGE UP (ref 7–23)
CALCIUM SERPL-MCNC: 9.2 MG/DL — SIGNIFICANT CHANGE UP (ref 8.4–10.5)
CHLORIDE SERPL-SCNC: 106 MMOL/L — SIGNIFICANT CHANGE UP (ref 98–107)
CO2 SERPL-SCNC: 22 MMOL/L — SIGNIFICANT CHANGE UP (ref 22–31)
CREAT SERPL-MCNC: 0.84 MG/DL — SIGNIFICANT CHANGE UP (ref 0.5–1.3)
EGFR: 110 ML/MIN/1.73M2 — SIGNIFICANT CHANGE UP
EOSINOPHIL # BLD AUTO: 0.04 K/UL — SIGNIFICANT CHANGE UP (ref 0–0.5)
EOSINOPHIL NFR BLD AUTO: 0.6 % — SIGNIFICANT CHANGE UP (ref 0–6)
GLUCOSE SERPL-MCNC: 101 MG/DL — HIGH (ref 70–99)
HCT VFR BLD CALC: 39.1 % — SIGNIFICANT CHANGE UP (ref 39–50)
HGB BLD-MCNC: 13.5 G/DL — SIGNIFICANT CHANGE UP (ref 13–17)
IANC: 4.32 K/UL — SIGNIFICANT CHANGE UP (ref 1.8–7.4)
IMM GRANULOCYTES NFR BLD AUTO: 0.3 % — SIGNIFICANT CHANGE UP (ref 0–0.9)
INR BLD: 1.14 RATIO — SIGNIFICANT CHANGE UP (ref 0.85–1.18)
LYMPHOCYTES # BLD AUTO: 1.5 K/UL — SIGNIFICANT CHANGE UP (ref 1–3.3)
LYMPHOCYTES # BLD AUTO: 24.1 % — SIGNIFICANT CHANGE UP (ref 13–44)
MCHC RBC-ENTMCNC: 30 PG — SIGNIFICANT CHANGE UP (ref 27–34)
MCHC RBC-ENTMCNC: 34.5 GM/DL — SIGNIFICANT CHANGE UP (ref 32–36)
MCV RBC AUTO: 86.9 FL — SIGNIFICANT CHANGE UP (ref 80–100)
MONOCYTES # BLD AUTO: 0.32 K/UL — SIGNIFICANT CHANGE UP (ref 0–0.9)
MONOCYTES NFR BLD AUTO: 5.1 % — SIGNIFICANT CHANGE UP (ref 2–14)
NEUTROPHILS # BLD AUTO: 4.32 K/UL — SIGNIFICANT CHANGE UP (ref 1.8–7.4)
NEUTROPHILS NFR BLD AUTO: 69.6 % — SIGNIFICANT CHANGE UP (ref 43–77)
NRBC # BLD: 0 /100 WBCS — SIGNIFICANT CHANGE UP (ref 0–0)
NRBC # FLD: 0 K/UL — SIGNIFICANT CHANGE UP (ref 0–0)
PLATELET # BLD AUTO: 221 K/UL — SIGNIFICANT CHANGE UP (ref 150–400)
POTASSIUM SERPL-MCNC: 3.9 MMOL/L — SIGNIFICANT CHANGE UP (ref 3.5–5.3)
POTASSIUM SERPL-SCNC: 3.9 MMOL/L — SIGNIFICANT CHANGE UP (ref 3.5–5.3)
PROT SERPL-MCNC: 7.4 G/DL — SIGNIFICANT CHANGE UP (ref 6–8.3)
PROTHROM AB SERPL-ACNC: 12.8 SEC — SIGNIFICANT CHANGE UP (ref 9.5–13)
RBC # BLD: 4.5 M/UL — SIGNIFICANT CHANGE UP (ref 4.2–5.8)
RBC # FLD: 13.2 % — SIGNIFICANT CHANGE UP (ref 10.3–14.5)
SODIUM SERPL-SCNC: 140 MMOL/L — SIGNIFICANT CHANGE UP (ref 135–145)
WBC # BLD: 6.22 K/UL — SIGNIFICANT CHANGE UP (ref 3.8–10.5)
WBC # FLD AUTO: 6.22 K/UL — SIGNIFICANT CHANGE UP (ref 3.8–10.5)

## 2024-07-25 PROCEDURE — 99285 EMERGENCY DEPT VISIT HI MDM: CPT

## 2024-07-25 RX ORDER — SODIUM CHLORIDE 0.9 % (FLUSH) 0.9 %
1000 SYRINGE (ML) INJECTION ONCE
Refills: 0 | Status: COMPLETED | OUTPATIENT
Start: 2024-07-25 | End: 2024-07-25

## 2024-07-25 RX ORDER — METOCLOPRAMIDE 5 MG/5ML
10 SOLUTION ORAL ONCE
Refills: 0 | Status: COMPLETED | OUTPATIENT
Start: 2024-07-25 | End: 2024-07-25

## 2024-07-25 RX ORDER — ACETAMINOPHEN 325 MG
650 TABLET ORAL ONCE
Refills: 0 | Status: COMPLETED | OUTPATIENT
Start: 2024-07-25 | End: 2024-07-25

## 2024-07-25 RX ADMIN — Medication 1000 MILLILITER(S): at 22:39

## 2024-07-25 RX ADMIN — METOCLOPRAMIDE 10 MILLIGRAM(S): 5 SOLUTION ORAL at 22:40

## 2024-07-25 RX ADMIN — Medication 650 MILLIGRAM(S): at 22:40

## 2024-07-25 NOTE — ED ADULT NURSE NOTE - NSFALLUNIVINTERV_ED_ALL_ED
Bed/Stretcher in lowest position, wheels locked, appropriate side rails in place/Call bell, personal items and telephone in reach/Instruct patient to call for assistance before getting out of bed/chair/stretcher/Non-slip footwear applied when patient is off stretcher/[place] to call system/Physically safe environment - no spills, clutter or unnecessary equipment/Purposeful proactive rounding/Room/bathroom lighting operational, light cord in reach

## 2024-07-25 NOTE — ED ADULT NURSE NOTE - OBJECTIVE STATEMENT
Pt received in ER alert & awake , able to make needs known with clear speech. Pt c/o headache to left posterior head radiating down his neck.  Pt denies numbness or tingling . Pt denies any blurred  vision or double vision.  Pt states prior to arrival to hospital his forehead became cold and sweaty and he felt like his eyes were heavy.  Pt reports he was feeling some dizziness upon ambulation prior to arrival to hospital.  No s/s of neuro deficit observed.  Pt reports h/o HTN, however he was not taken his medications the last few days.  20 G saline lock placed o right upper extremity, labs drawn, medications given and IVF started. call bell in place at side.

## 2024-07-25 NOTE — CONSULT NOTE ADULT - SUBJECTIVE AND OBJECTIVE BOX
Neurology - Consult Note    -  Spectra: [phone] ([facility]), [phone] ([facility]). For new consults, please page: [phone] ([facility]), [phone] ([facility]).  -    HPI: Patient JOE CUEVA is a 43y (1980) ***-handed man who presents to [facility] ED on 7/25/2024, with c/o headache x 3 days with numbness of the left lip.    PMH significant for: HTN, HLD (non-compliant with medications)    ***    Review of Systems:  INCOMPLETE   All other review of systems is negative unless indicated above.    Allergies:  No Known Allergies      PMHx/PSHx/Family Hx: As above, otherwise see below   H. pylori infection    Hypertension    Hypercholesteremia        Social Hx:  Per HPI    Medications:  MEDICATIONS  (STANDING):    MEDICATIONS  (PRN):      Vitals:  T(C): 36.9 (07-25-24 @ 20:31), Max: 36.9 (07-25-24 @ 20:31)  HR: 71 (07-25-24 @ 20:31) (71 - 71)  BP: 135/82 (07-25-24 @ 20:31) (135/82 - 135/82)  RR: 16 (07-25-24 @ 20:31) (16 - 16)  SpO2: 96% (07-25-24 @ 20:31) (96% - 96%)    Physical Examination: INCOMPLETE  General - Sitting up on ED cart  Cardiovascular - No LE edema  Eyes - Non-injected conjunctivae, anicteric sclerae    Neurologic Exam:  Mental status:  - Awake, Alert  - Oriented to: person, place, and time  - Speech: fluent  - Repetition and naming: intact   - Follows simple and cross commands   - Attention/concentration: intact  - Recent and remote memory (including registration and recall): registration intact, 3/3 on 3-word recall  - Fund of knowledge: intact    Cranial nerves - PERRL - no rAPD, VFF on confrontational testing, EOMI - no nystagmus, face sensation (V1-V3) intact b/l, facial strength intact without asymmetry b/l, hearing grossly intact, palate with symmetric elevation, shoulder shrug intact b/l, tongue midline on protrusion with full lateral movement  Dysarthria: ***    Motor - Normal bulk throughout. No pronator drift.  Strength testing (R/L)  Deltoid:  5/5  Biceps:  5/5        Triceps:  5/5       Wrist Extension:  5/5      Wrist Flexion:  5/5       Interossei:  5/5        :  5/5    Hip Flexion:  5/5  Hip Extension:  5/5      Knee Flexion:  5/5      Knee Extension:  5/5      Dorsiflexion:  5/5      Plantar Flexion:  5/5    Sensation - Light touch intact throughout    DTRs (R/L)  Biceps:  2+/2+        Triceps:  2+/2+       Brachioradialis:  2+/2+        Patellar:  2+/2+      Ankle:  2+/2+    no ankle clonus  Plantar response:  Down/Down  **Deferred d/t focused neurologic exam    Coordination - FNF, HTS intact b/l. No tremors appreciated.    Gait and station - Unable to assess d/t fall risk/safety concerns.    Labs:          CAPILLARY BLOOD GLUCOSE      POCT Blood Glucose.: 160 mg/dL (25 Jul 2024 20:42)          CSF:                  Radiology:     Neurology - Consult Note    -  Spectra: [phone] ([facility]), [phone] ([facility]). For new consults, please page: [phone] ([facility]), [phone] ([facility]).  -    HPI: Patient JOE CUEVA is a 43y (1980) RIGHT-handed man who presents to [facility] ED on 7/25/2024, with c/o headache x 3 days with numbness of the left lip.    PMH significant for: HTN, HLD (non-compliant with medications)     Trish ([id number])    Extremely limited historian. Patient presenting with his female friend. Coming in with c/o new headache x 3 days. Patient tells me that he developed a headache 3 days ago.  He says the pain of the headache has moved from the left side of his head to the left side of his neck.  He says it is involving the shoulder only.  Patient reports that today he felt lightheaded.  He denies any room spinning dizziness.  He reports that the sensation were as if he were going to pass out.  Patient says his eyes felt heavy and he "could not see good."  He reports that his eyes felt heavy. Started in the left side of his head at a 7/10 pain - migrated down to his left shoulder/neck (actual looks to be occipital area and lateral neck/trapezius). No longer present in his head. 5/10 now. Pulsatile. No positionality. Associated with pressure behind his eyes. Blurry vision R>L. No nausea/vomiting. No recent trauma/neck manipulation. Comes and goes - not constant. Unclear past headache history. He reports vague history - says when he bends down for a long time and stands up, he will develop a headache. Reportedly took some ibuprofen for the pain at home.    Denies history of stroke/MI. He does not take any blood thinners or aspirin.  He reports that he walks without assistive device.  He is able to climb stairs.  Patient drives.  Patient denies tobacco, alcohol, recreational drug use.  Patient lives with a friend that is accompanying him.  Patient works in construction.    Review of Systems:   All other review of systems is negative unless indicated above.    Allergies:  No Known Allergies      PMHx/PSHx/Family Hx: As above, otherwise see below   H. pylori infection    Hypertension    Hypercholesteremia        Social Hx:  Per HPI    Medications:  MEDICATIONS  (STANDING):    MEDICATIONS  (PRN):      Vitals:  T(C): 36.9 (07-25-24 @ 20:31), Max: 36.9 (07-25-24 @ 20:31)  HR: 71 (07-25-24 @ 20:31) (71 - 71)  BP: 135/82 (07-25-24 @ 20:31) (135/82 - 135/82)  RR: 16 (07-25-24 @ 20:31) (16 - 16)  SpO2: 96% (07-25-24 @ 20:31) (96% - 96%)    Physical Examination:  General - Sitting up on ED cart, he is very well-appearing, dressed appropriately, in NAD, wearing a surgical mask  Cardiovascular - No LE edema  Eyes - Non-injected conjunctivae, anicteric sclerae  Neck - He is TTP in the posterior neck on the left side    Neurologic Exam:  Mental status:  - Awake, Alert  - Oriented to: person. Says he is in the hospital at "[facility]." Knows month/year but not the exact date.  - Speech: fluent  - Repetition and naming: intact   - Follows simple and cross commands    Cranial nerves - PERRL - no rAPD, VFF on confrontational testing, EOMI - no nystagmus, face sensation (V1-V3) intact b/l, facial strength intact without asymmetry b/l - he says his lip is twisted when he purses his lips but it appears there is just a mild asymmetry of his lips when pursed, hearing grossly intact, palate with symmetric elevation, shoulder shrug intact b/l, tongue midline on protrusion with full lateral movement  Dysarthria: not present    Motor - Normal bulk throughout. No pronator drift.  Strength testing (R/L)  Deltoid:  5/5  Biceps:  5/5        Triceps:  5/5       Wrist Extension:  5/5      Wrist Flexion:  5/5       Interossei:  5/5        :  5/5    FFM is intact  No orbiting with forearm rolling    Hip Flexion:  5/5  Hip Extension:  5/5      Knee Flexion:  5/5      Knee Extension:  5/5      Dorsiflexion:  5/5      Plantar Flexion:  5/5    Sensation - Light touch intact throughout    DTRs (R/L)  Biceps:  2+/2+         Brachioradialis:  2+/2+        Patellar:  2+/2+      Ankle:  2+/2+    no ankle clonus    Coordination - FNF, HTS intact b/l. No tremors appreciated.    Gait and station - Unable to assess d/t fall risk/safety concerns. He is connected to telemetry and IV pole.    Labs:          CAPILLARY BLOOD GLUCOSE      POCT Blood Glucose.: 160 mg/dL (25 Jul 2024 20:42)          CSF:                  Radiology:     Neurology - Consult Note    -  Spectra: [phone] ([facility]), [phone] ([facility]). For new consults, please page: [phone] ([facility]), [phone] ([facility]).  -    HPI: Patient OJE CUEVA is a 43y (1980) RIGHT-handed man who presents to [facility] ED on 7/25/2024, with c/o headache x 3 days with numbness of the left lip.    PMH significant for: HTN, HLD (non-compliant with medications)     Trish ([id number])    Extremely limited historian. Patient presenting with his female friend. Coming in with c/o new headache x 3 days. Patient tells me that he developed a headache 3 days ago.  He says the pain of the headache has moved from the left side of his head to the left side of his neck.  He says it is involving the shoulder only.  Patient reports that today he felt lightheaded.  He denies any room spinning dizziness.  He reports that the sensation were as if he were going to pass out.  Patient says his eyes felt heavy and he "could not see good."  He reports that his eyes felt heavy. Started in the left side of his head at a 7/10 pain - migrated down to his left shoulder/neck (actual looks to be occipital area and lateral neck/trapezius). No longer present in his head. 5/10 now. Pulsatile. No positionality. Associated with pressure behind his eyes. Denies photophobia. Blurry vision R>L. No nausea/vomiting. No recent trauma/neck manipulation. Comes and goes - not constant. Unclear past headache history. He reports vague history - says when he bends down for a long time and stands up, he will develop a headache. Reportedly took some ibuprofen for the pain at home.    Denies history of stroke/MI. He does not take any blood thinners or aspirin.  He reports that he walks without assistive device.  He is able to climb stairs.  Patient drives.  Patient denies tobacco, alcohol, recreational drug use.  Patient lives with a friend that is accompanying him.  Patient works in construction.    Review of Systems:   All other review of systems is negative unless indicated above.    Allergies:  No Known Allergies      PMHx/PSHx/Family Hx: As above, otherwise see below   H. pylori infection    Hypertension    Hypercholesteremia        Social Hx:  Per HPI    Medications:  MEDICATIONS  (STANDING):    MEDICATIONS  (PRN):      Vitals:  T(C): 36.9 (07-25-24 @ 20:31), Max: 36.9 (07-25-24 @ 20:31)  HR: 71 (07-25-24 @ 20:31) (71 - 71)  BP: 135/82 (07-25-24 @ 20:31) (135/82 - 135/82)  RR: 16 (07-25-24 @ 20:31) (16 - 16)  SpO2: 96% (07-25-24 @ 20:31) (96% - 96%)    Physical Examination:  General - Sitting up on ED cart, he is very well-appearing, dressed appropriately, in NAD, wearing a surgical mask  Cardiovascular - No LE edema  Eyes - Non-injected conjunctivae, anicteric sclerae  Neck - He is TTP in the posterior neck on the left side    Neurologic Exam:  Mental status:  - Awake, Alert  - Oriented to: person. Says he is in the hospital at "[facility]." Knows month/year but not the exact date.  - Speech: fluent  - Repetition and naming: intact   - Follows simple and cross commands    Cranial nerves - VA (uncorrected): 20/40 OD, 20/40 -1 OS. PERRL - no rAPD, VFF on confrontational testing, EOMI - no nystagmus, face sensation (V1-V3) intact b/l, facial strength intact without asymmetry b/l - he says his lip is twisted when he purses his lips but it appears there is just a mild asymmetry of his lips when pursed, hearing grossly intact, palate with symmetric elevation, shoulder shrug intact b/l, tongue midline on protrusion with full lateral movement  Dysarthria: not present    Motor - Normal bulk throughout. No pronator drift.  Strength testing (R/L)  Deltoid:  5/5  Biceps:  5/5        Triceps:  5/5       Wrist Extension:  5/5      Wrist Flexion:  5/5       Interossei:  5/5        :  5/5    FFM is intact  No orbiting with forearm rolling    Hip Flexion:  5/5  Hip Extension:  5/5      Knee Flexion:  5/5      Knee Extension:  5/5      Dorsiflexion:  5/5      Plantar Flexion:  5/5    Sensation - Light touch intact throughout    DTRs (R/L)  Biceps:  2+/2+         Brachioradialis:  2+/2+        Patellar:  2+/2+      Ankle:  2+/2+    no ankle clonus    Coordination - FNF, HTS intact b/l. No tremors appreciated.    Gait and station - Unable to assess d/t fall risk/safety concerns. He is connected to telemetry and IV pole.    Labs:          CAPILLARY BLOOD GLUCOSE      POCT Blood Glucose.: 160 mg/dL (25 Jul 2024 20:42)          CSF:                  Radiology:

## 2024-07-25 NOTE — ED ADULT TRIAGE NOTE - CHIEF COMPLAINT QUOTE
pt c/o left sided headache down neck and dizziness x3 days. states hasn't taken BP medications in 6 days due to refill. hx. HTN. HLD. pt denies n/v, vision changes, numbness/weakness to extremities, chest pain, fevers. pt well appearing.

## 2024-07-25 NOTE — ED PROVIDER NOTE - NSFOLLOWUPINSTRUCTIONS_ED_ALL_ED_FT
Tunisian  Esta noche lo atendieron en el departamento de emergencias por arnold de carlos a y mareos. Obtuvimos análisis de laboratorio que no mostraron nada destacable ni anormal.    Obtuvimos tyler tomografía computarizada de bundy carlos a y jayson y descubrimos que tiene evidencia de tyler afección llamada malformación de Chiari I    Le dieron información sobre lisa hallazgo para que la renetta    También puede buscar más información en línea    También descubrimos incidentalmente que tenía un nódulo de 6 mm en el pulmón derecho. Lleve estos resultados a tyler lazarus de seguimiento con bundy médico de atención primaria. No existe un tratamiento específico para esto, marshall debe controlarlo con imágenes repetidas dentro de los próximos 6 a 12 meses    Para bundy afección de dolor de carlos a y mareos, puede anahy Tylenol o ibuprofeno para el dolor según sea necesario, que le recetarán en bundy farmacia.    Si abby síntomas de dolor de carlos a y mareos empeoran o comienza a tener otros síntomas estella náuseas, vómitos, cambios en la visión o la audición, o si tiene entumecimiento u hormigueo en los brazos o las piernas, regrese al departamento de emergencias lo antes posible por bundy afección.    De lo contrario, le recomendamos que consulte al neurólogo, también conocido estella médico del cerebro, y a bundy médico de atención primaria para tyler resonancia magnética ambulatoria.    Alguien de nuestro hospital lo llamará para ayudarlo a programar tyler lazarus con el médico del cerebro y bundy médico de atención primaria.    ENGLISH  You were seen tonight in the emergency department for headaches and dizziness.  We obtained labs which were unremarkable and did not show any abnormalities.    We obtained a CT of your head and your neck and found that you have evidence of a condition called Chiari I malformation    You were given information regarding this finding to read    You can also look up further information online    We also found incidentally that you had a 6 mm nodule in the right lung.  Please take these results to follow-up with your primary care doctor.  There is no specific treatment for this but you should have it monitored with repeat imaging within the next 6 to 12 months    For your condition of headache and dizziness you can take Tylenol or ibuprofen for pain as needed which will be prescribed to your pharmacy.    If your symptoms of headache and dizziness worsen or you start having other symptoms such as nausea, vomiting, vision or hearing changes or you have numbness or tingling in your arms or legs please come back to the emergency department as soon as possible for your condition    Otherwise we recommend you see the neurologist also known as a brain doctor and your primary care doctor for an MRI outpatient    Someone from our hospital will call you to help you set up an appointment with the brain doctor as well as her primary care doctor.

## 2024-07-25 NOTE — ED PROVIDER NOTE - NS ED MD DISPO DISCHARGE
Detail Level: Zone
Patient Specific Counseling (Will Not Stick From Patient To Patient): ***\\n-Discussed that there’s minimal redness. Recommended always using sunscreen.
Detail Level: Simple
Detail Level: Detailed
Home

## 2024-07-25 NOTE — ED PROVIDER NOTE - PATIENT PORTAL LINK FT
You can access the FollowMyHealth Patient Portal offered by [facility] by registering at the following website: [email or web address]. By joining [facility]’s FollowMyHealth portal, you will also be able to view your health information using other applications (apps) compatible with our system.

## 2024-07-25 NOTE — ED PROVIDER NOTE - CLINICAL SUMMARY MEDICAL DECISION MAKING FREE TEXT BOX
Will check labs and provide pain control with tylenol, reglan, fluids. The pt is having dizziness with lip 'crookedness' and numbness on the left lip so will consult neurology to r/o possibility of posterior cva and will reassess.

## 2024-07-25 NOTE — CONSULT NOTE ADULT - ASSESSMENT
JOE CUEVA is a 43y RIGHT-handed man who presents to [facility] ED on 7/25/2024, with c/o headache x 3 days with numbness of the left lip.    ED vitals notable for: afebrile, HR 71, /82, RR 16, SpO2 96% on RA. Labs notable for: glucose 160. Exam notable for nonfocal neurologic examination.     Impression: Suspect primary headache disorder. R/o secondary cause of headache. Doubt vascular cause given no focal findings on exam - I do not appreciate any facial weakness, there is no numbness in his face at this time.    Recommendations:  [] Not unreasonable to obtain CT head and CTA head/neck - evaluate for arterial dissection or an intracranial process  [] Abortive therapies:  - First line: recommend headache medications (to be given all together at the same time): ketorolac (Toradol) 30mg IV q8h PRN (or acetaminophen 1g IV q8h PRN), metoclopramide (Reglan) 10mg q8h PRN, diphenhydramine (Benadryl) 25mg IV q8h PRN. Please repeat at least 2-3 cycles for medications to be effective.  - IV hydration, Mg 2g IV x1  - Second line: recommend methylprednisolone 125mg IV PRN x1  - Third line: recommend VPA 500mg IV PRN x1     If observed in CDU or admitted to the hospital - will be seen by attending neurologist in the AM. Plan is only formalized when attending attestation is complete. JOE CUEVA is a 43y RIGHT-handed man who presents to [facility] ED on 7/25/2024, with c/o headache x 3 days with numbness of the left lip.    ED vitals notable for: afebrile, HR 71, /82, RR 16, SpO2 96% on RA. Labs notable for: glucose 160. Exam notable for nonfocal neurologic examination.     Impression: Suspect primary headache. R/o secondary cause of headache. Doubt vascular cause given no focal findings on exam - I do not appreciate any facial weakness, there is no numbness in his face at this time.    Recommendations:  [] Not unreasonable to obtain CT head and CTA head/neck - evaluate for arterial dissection or an intracranial process given this is a new headache  [] Abortive therapies - monitor for resolution of symptoms:  - First line: recommend headache medications (to be given all together at the same time): ketorolac (Toradol) 30mg IV q8h PRN (or acetaminophen 1g IV q8h PRN), metoclopramide (Reglan) 10mg q8h PRN. Please repeat at least 2-3 cycles for medications to be effective.  - IV hydration, Mg 2g IV x1  - Second line: recommend methylprednisolone 125mg IV PRN x1  - Third line: recommend VPA 500mg IV PRN x1     If observed in CDU or admitted to the hospital - will be seen by attending neurologist in the AM. Plan is only formalized when attending attestation is complete. JOE CUEVA is a 43y RIGHT-handed man who presents to [facility] ED on 7/25/2024, with c/o headache x 3 days with numbness of the left lip.    ED vitals notable for: afebrile, HR 71, /82, RR 16, SpO2 96% on RA. Labs notable for: glucose 160. Exam notable for nonfocal neurologic examination.     Impression: Suspect primary headache. R/o secondary cause of headache. Doubt vascular cause given no focal findings on exam - I do not appreciate any facial weakness, there is no numbness in his face at this time.    Recommendations:  [] Not unreasonable to obtain CT head and CTA head/neck - evaluate for arterial dissection or an intracranial process given this is a new headache  [] Abortive therapies - monitor for resolution of symptoms:  - First line: recommend headache medications (to be given all together at the same time): ketorolac (Toradol) 30mg IV q8h PRN (or acetaminophen 1g IV q8h PRN), metoclopramide (Reglan) 10mg q8h PRN. Please repeat at least 2-3 cycles for medications to be effective.  - IV hydration, Mg 2g IV x1  - Second line: recommend methylprednisolone 125mg IV PRN x1  - Third line: recommend VPA 500mg IV PRN x1     If observed in CDU or admitted to the hospital - will be seen by attending neurologist in the AM. Plan is only formalized when attending attestation is complete.    Attending Attestation:  This case was not discussed with me prior to being discharged from the ED.

## 2024-07-25 NOTE — ED PROVIDER NOTE - OBJECTIVE STATEMENT
44 yo M with PMHx HTN (not compliant withe meds but also normotensive in the ED) here with left sided headache that begins in the back of his neck a/w dizziness and left lip numbness and 'crookedness' x 3 days. The pt denies f/c, vision changes, photophobia, sob, cp, n/v, paresthesias, weakness.

## 2024-07-26 VITALS
RESPIRATION RATE: 14 BRPM | DIASTOLIC BLOOD PRESSURE: 82 MMHG | TEMPERATURE: 98 F | SYSTOLIC BLOOD PRESSURE: 123 MMHG | OXYGEN SATURATION: 100 % | HEART RATE: 48 BPM

## 2024-07-26 PROCEDURE — 93010 ELECTROCARDIOGRAM REPORT: CPT | Mod: 76

## 2024-07-26 PROCEDURE — 70496 CT ANGIOGRAPHY HEAD: CPT | Mod: 26,MC

## 2024-07-26 PROCEDURE — 70498 CT ANGIOGRAPHY NECK: CPT | Mod: 26,MC

## 2024-07-26 RX ORDER — ACETAMINOPHEN 325 MG
2 TABLET ORAL
Qty: 40 | Refills: 0
Start: 2024-07-26 | End: 2024-07-30

## 2024-07-26 NOTE — ED ADULT NURSE REASSESSMENT NOTE - NS ED NURSE REASSESS COMMENT FT1
Pt awake   & alert able to make nees known . Speech noted clear. Pt reports headache to left posterior head / neck pain level 4 out 10. Pt denies any c/o  vision changes.  Pt denies any c/o dizziness upon ambulation . Cardiac monitor in place.  Pt normal sinus rhythm on the monitor. No s/s of distress noted. Pt awaiting Dispo.

## 2024-07-26 NOTE — ED ADULT NURSE REASSESSMENT NOTE - NS ED NURSE REASSESS COMMENT FT1
Pt observed alert & awake .  Cardiac monitor in place, pt sinus lisandro on the monitor. Pt denies dizziness, or SOB.  Pt denies any chest pain or discomfort.   Pt reports headache to left posterior head/ neck level 4 out of 10.  No vision changes noted.  No s/s of neuro deficit noted.

## 2024-07-27 ENCOUNTER — EMERGENCY (EMERGENCY)
Facility: HOSPITAL | Age: 44
LOS: 1 days | Discharge: HOME | End: 2024-07-27
Attending: EMERGENCY MEDICINE | Admitting: EMERGENCY MEDICINE
Payer: COMMERCIAL

## 2024-07-27 VITALS
SYSTOLIC BLOOD PRESSURE: 155 MMHG | TEMPERATURE: 99 F | HEIGHT: 66 IN | RESPIRATION RATE: 17 BRPM | OXYGEN SATURATION: 98 % | DIASTOLIC BLOOD PRESSURE: 94 MMHG | WEIGHT: 179.9 LBS | HEART RATE: 64 BPM

## 2024-07-27 PROCEDURE — 99284 EMERGENCY DEPT VISIT MOD MDM: CPT

## 2024-07-27 PROCEDURE — 93010 ELECTROCARDIOGRAM REPORT: CPT

## 2024-07-27 RX ORDER — PREDNISONE 10 MG/1
3 TABLET ORAL
Qty: 21 | Refills: 0
Start: 2024-07-27 | End: 2024-08-02

## 2024-07-27 RX ORDER — VALACYCLOVIR HYDROCHLORIDE 500 MG/1
1 TABLET, FILM COATED ORAL
Qty: 21 | Refills: 0
Start: 2024-07-27 | End: 2024-08-02

## 2024-07-27 NOTE — ED ADULT NURSE NOTE - OBJECTIVE STATEMENT
pt reporting left sided facial droop since yesterday morning. pt also reports left sided neck pain x 2 days. pt A&OX4, able to speak in clear complete sentences, SUH equal bilaterally. safety measures maintained, side rails up x2. family at bedside

## 2024-07-27 NOTE — ED PROVIDER NOTE - PATIENT PORTAL LINK FT
You can access the FollowMyHealth Patient Portal offered by [facility] by registering at the following website: [email or web address]. By joining [facility]’s FollowMyHealth portal, you will also be able to view your health information using other applications (apps) compatible with our system.

## 2024-07-27 NOTE — ED ADULT TRIAGE NOTE - CHIEF COMPLAINT QUOTE
Pt arrives to ED c/o left sided facial droop last know well 7/26/24 at 09:00.  Pt was seen 2 days ago for left sided neck pain and dizziness which has not resolved.  No loss of sensation, no pronator drift, no weakness to extremities. fs =124  Dr. Sellers consulted, no stroke code called.  Symptoms over 24 hours. Pt arrives to ED c/o left sided facial droop, last know well 7/26/24 at 09:00.  Pt was seen 2 days ago for left sided neck pain and dizziness which has not resolved.  No loss of sensation, no pronator drift, no weakness to extremities. fs =124  Dr. Sellers consulted, no stroke code called.  Symptoms over 24 hours.  Hx HTN, HLD, H. pylori

## 2024-07-27 NOTE — ED PROVIDER NOTE - CLINICAL SUMMARY MEDICAL DECISION MAKING FREE TEXT BOX
Myriam: ~2 days L facial droop. Seen here a few nights ago for, at that time, 3 days left sided headache that begins in the back of his neck a/w dizziness and left lip numbness and 'crookedness.' 2 days ago,  CT/CTA unremarkable other than Chiari malformation. Lab results unremarkable. Now w/ Unable to raise L eyebrow and has persistent neck pain. No meningismus or confusion. Neck supple. Give artifical tears and erythromycin. Steroids and valacyclovir. F/u Neuro.

## 2024-07-27 NOTE — ED PROVIDER NOTE - NSFOLLOWUPCLINICS_GEN_ALL_ED_FT
[facility]  Neurology  [address] [place] [address] [address] [place] [address]  Phone: [phone]  Fax:   Follow Up Time: Routine

## 2024-07-27 NOTE — ED PROVIDER NOTE - ATTENDING CONTRIBUTION TO CARE
I performed a face-to-face evaluation of the patient and performed a history and physical examination. I agree with the history and physical examination. If this was a PA visit, I personally saw the patient with the PA and performed a substantive portion of the visit including all aspects of the medical decision making.    ~2 days L facial droop. Seen here a few nights ago for, at that time, 3 days left sided headache that begins in the back of his neck a/w dizziness and left lip numbness and 'crookedness.' 2 days ago,  CT/CTA unremarkable other than Chiari malformation. Lab results unremarkable. Now w/ Unable to raise L eyebrow and has persistent neck pain. No meningismus or confusion. Neck supple. Give artifical tears and erythromycin. Steroids and valacyclovir. F/u Neuro.

## 2024-07-27 NOTE — ED PROVIDER NOTE - NS ED ROS FT
REVIEW OF SYSTEMS:    CONSTITUTIONAL: No fevers or chills  EYES/ENT: No visual changes;  No vertigo or throat pain   NECK: No stiffness  RESPIRATORY: No cough, wheezing, hemoptysis; No shortness of breath  CARDIOVASCULAR: No chest pain or palpitations  GASTROINTESTINAL: No abdominal or epigastric pain. No nausea, vomiting, or hematemesis; No diarrhea or constipation. No melena or hematochezia.  GENITOURINARY: No dysuria, frequency or hematuria  SKIN: No itching, rashes

## 2024-07-27 NOTE — ED PROVIDER NOTE - NSFOLLOWUPINSTRUCTIONS_ED_ALL_ED_FT
If it starts to affect the right side of your face, this might be sarcoidosis or Lyme disease. You were diagnosed with Bell's Palsy. This is treated with prednisone and valacyclovir which are sent to your pharmacy.  Use the artificial tears during the day and erythromycin ointment at night to keep the Left eye moist.  If it starts to affect the right side of your face, this might be sarcoidosis or Lyme disease.  Follow up with your PCP regarding your previous Neck CT results.  Follow up with neurology if your symptoms do not improve after 3-4 weeks.

## 2024-07-27 NOTE — ED ADULT NURSE REASSESSMENT NOTE - NS ED NURSE REASSESS COMMENT FT1
discharge papers provided by MD, pt verbalized understanding of instructions. pt in NAD, A&OX4, RR even and unlabored. able to walk with steady gait, wife at side, all belongings with pt discharge papers provided by MD, pt verbalized understanding of instructions. pt in NAD, A&OX4, RR even and unlabored. able to walk with steady gait, escorted out by MD. pt wife at side, all belongings with pt. pt left prior to discharge vitals

## 2024-07-27 NOTE — ED ADULT NURSE NOTE - CHIEF COMPLAINT QUOTE
Pt arrives to ED c/o left sided facial droop, last know well 7/26/24 at 09:00.  Pt was seen 2 days ago for left sided neck pain and dizziness which has not resolved.  No loss of sensation, no pronator drift, no weakness to extremities. fs =124  Dr. Sellers consulted, no stroke code called.  Symptoms over 24 hours.  Hx HTN, HLD, H. pylori

## 2024-07-27 NOTE — ED PROVIDER NOTE - OBJECTIVE STATEMENT
42 y/o M, PMH HTN, HLD, presents to ED c/o L sided facial droop, tearing, L facial numbness x 1 day, with mild nausea and dizziness described as room spinning x 5-6 hours. He states he cannot blink his left eye, Pt was recently seen in ED for neck pain on night of 7/25, described as pinching and sharp, worse with head movement to the left, had CTA imaging showing possible chiari 1 malformation and discharged morning of 7/26. He states his L facial droop developed when he came home. He denies anyvomiting, extremity numbness/weakness, difficulty walking, or fever. Pt works as a . 44 y/o M, PMH HTN, HLD, presents to ED c/o L sided facial droop, tearing, L facial numbness x 1 day, with mild nausea and dizziness described as room spinning x 5-6 hours. He states he cannot blink his left eye, Pt was recently seen in ED for neck pain on night of 7/25, described as pinching and sharp, worse with head movement to the left, had CTA imaging showing possible chiari 1 malformation and discharged morning of 7/26. He states his L facial droop developed when he came home. He denies any vomiting, neck stiffness, extremity numbness/weakness, difficulty walking, or fever. Pt works as a .

## 2024-07-27 NOTE — ED ADULT NURSE NOTE - NSFALLUNIVINTERV_ED_ALL_ED
Bed/Stretcher in lowest position, wheels locked, appropriate side rails in place/Call bell, personal items and telephone in reach/Instruct patient to call for assistance before getting out of bed/chair/stretcher/Non-slip footwear applied when patient is off stretcher/[place] to call system/Physically safe environment - no spills, clutter or unnecessary equipment/Purposeful proactive rounding/Room/bathroom lighting operational, light cord in reach

## 2024-07-27 NOTE — ED ADULT NURSE NOTE - PATIENT IS UNABLE TO BE SCREENED DUE TO:
Patient was seen in the hospital and discharged on 7/22.  He does have acid reflux and was prescribed Zantac for 7 days.  He is almost out.  He has gained weight since 7/21. He has an appointment on 7/31.   Acuity of illness

## 2024-07-27 NOTE — ED PROVIDER NOTE - PHYSICAL EXAMINATION
T(C): 37 (07-27-24 @ 21:32), Max: 37 (07-27-24 @ 21:32)  HR: 64 (07-27-24 @ 21:32) (64 - 64)  BP: 155/94 (07-27-24 @ 21:32) (155/94 - 155/94)  RR: 17 (07-27-24 @ 21:32) (17 - 17)  SpO2: 98% (07-27-24 @ 21:32) (98% - 98%)    GENERAL: patient appears well, no acute distress, appropriate, pleasant  EYES: sclera clear, no exudates, pupils PERRL. EOM intact  ENMT: oropharynx clear without erythema, no exudates, moist mucous membranes Tongue protrudes midline  NECK: supple, soft, no thyromegaly noted. No c spine tenderness, no nuchal rigidity.  LUNGS: good air entry bilaterally, clear to auscultation, symmetric breath sounds, no wheezing or rhonchi appreciated  HEART: S1/S2, regular rate and rhythm, no murmurs noted, no lower extremity edema  GASTROINTESTINAL: abdomen is soft, nontender, nondistended, normoactive bowel sounds, no palpable masses  INTEGUMENT: good skin turgor, no lesions noted  MUSCULOSKELETAL: no clubbing or cyanosis, no obvious deformity  NEUROLOGIC: L facial paralysis, unable to blink L eye, raise L eyebrow, puff out L cheek. Loss of fine touch sensation to L face.                                 awake, alert, oriented x3, equal strength of upper and lower extremities.                                  Sensation intact to UE and LE                                 Finger to Nose intact. Heel to shin intact. Gait normal. Romberg negative.

## 2024-08-01 ENCOUNTER — OUTPATIENT (OUTPATIENT)
Dept: OUTPATIENT SERVICES | Facility: HOSPITAL | Age: 44
LOS: 1 days | End: 2024-08-01
Payer: COMMERCIAL

## 2024-08-01 ENCOUNTER — APPOINTMENT (OUTPATIENT)
Dept: MRI IMAGING | Facility: CLINIC | Age: 44
End: 2024-08-01

## 2024-08-01 DIAGNOSIS — Z00.00 ENCOUNTER FOR GENERAL ADULT MEDICAL EXAMINATION WITHOUT ABNORMAL FINDINGS: ICD-10-CM

## 2024-08-01 PROCEDURE — 70551 MRI BRAIN STEM W/O DYE: CPT

## 2024-08-01 PROCEDURE — 70551 MRI BRAIN STEM W/O DYE: CPT | Mod: 26

## 2024-08-07 ENCOUNTER — APPOINTMENT (OUTPATIENT)
Dept: NEUROLOGY | Facility: CLINIC | Age: 44
End: 2024-08-07

## 2024-08-21 ENCOUNTER — APPOINTMENT (OUTPATIENT)
Dept: GASTROENTEROLOGY | Facility: CLINIC | Age: 44
End: 2024-08-21
Payer: COMMERCIAL

## 2024-08-21 VITALS
DIASTOLIC BLOOD PRESSURE: 70 MMHG | HEIGHT: 67 IN | OXYGEN SATURATION: 96 % | RESPIRATION RATE: 16 BRPM | TEMPERATURE: 98 F | WEIGHT: 176 LBS | HEART RATE: 65 BPM | BODY MASS INDEX: 27.62 KG/M2 | SYSTOLIC BLOOD PRESSURE: 114 MMHG

## 2024-08-21 DIAGNOSIS — K21.9 GASTRO-ESOPHAGEAL REFLUX DISEASE W/OUT ESOPHAGITIS: ICD-10-CM

## 2024-08-21 DIAGNOSIS — G51.0 BELL'S PALSY: ICD-10-CM

## 2024-08-21 DIAGNOSIS — D12.6 BENIGN NEOPLASM OF COLON, UNSPECIFIED: ICD-10-CM

## 2024-08-21 PROCEDURE — 99214 OFFICE O/P EST MOD 30 MIN: CPT

## 2024-08-21 RX ORDER — ROSUVASTATIN CALCIUM 20 MG/1
20 TABLET, FILM COATED ORAL
Refills: 0 | Status: ACTIVE | COMMUNITY

## 2024-08-21 RX ORDER — VALSARTAN 80 MG/1
80 TABLET, COATED ORAL
Refills: 0 | Status: ACTIVE | COMMUNITY

## 2024-08-21 NOTE — REVIEW OF SYSTEMS
[As Noted in HPI] : as noted in HPI [Negative] : Heme/Lymph [de-identified] : Bell's palsy residua

## 2024-08-21 NOTE — REASON FOR VISIT
[Follow-up] : a follow-up of an existing diagnosis [FreeTextEntry1] : Felicity palsy, s/p colonoscopy

## 2024-08-21 NOTE — ASSESSMENT
[FreeTextEntry1] : Patient is status post a colonoscopy that revealed a tubular adenoma on the ileocecal valve that was removed. After the colonoscopy, patient had a bout of Bell's palsy.  He is seeing a neurologist.  He still has some residual.  He is finishing a Medrol Dosepak. Patient complains of heartburn and dyspepsia.  He will be restarted on omeprazole 40 mg daily.

## 2024-08-21 NOTE — PHYSICAL EXAM
[Alert] : alert [Normal Voice/Communication] : normal voice/communication [Healthy Appearing] : healthy appearing [No Acute Distress] : no acute distress [Sclera] : the sclera and conjunctiva were normal [Hearing Threshold Finger Rub Not [place]] : hearing was normal [Normal Lips/Gums] : the lips and gums were normal [Oropharynx] : the oropharynx was normal [Normal Appearance] : the appearance of the neck was normal [No Neck Mass] : no neck mass was observed [No Respiratory Distress] : no respiratory distress [No Acc Muscle Use] : no accessory muscle use [Respiration, Rhythm And Depth] : normal respiratory rhythm and effort [Auscultation Breath Sounds / Voice Sounds] : lungs were clear to auscultation bilaterally [Heart Rate And Rhythm] : heart rate was normal and rhythm regular [Normal S1, S2] : normal S1 and S2 [Murmurs] : no murmurs [Bowel Sounds] : normal bowel sounds [Abdomen Tenderness] : non-tender [No Masses] : no abdominal mass palpated [Abdomen Soft] : soft [] : no hepatosplenomegaly [Oriented To Time, Place, And Person] : oriented to person, place, and time

## 2024-08-21 NOTE — HISTORY OF PRESENT ILLNESS
[FreeTextEntry1] : Patient is a 43-year-old gentleman who is referred for a screening colonoscopy.  This would be his first.  His bowel movements are regular.  He denies seeing any blood or mucus in the stool. He does have a history of chronic GERD and takes omeprazole 40 mg daily.  He had an upper endoscopy in 2020 that revealed a hiatus hernia and H. pylori gastritis.  The H. pylori was treated and eradicated.  He has developed a recurrence of his heartburn and some regurgitation.  This occurs especially after eating certain foods.  He has been off omeprazole recently.  He denies any dysphagia or early satiety.  8/21/2024-patient is status post a colonoscopy on 7/15/2024.  He did have a small polyp on the ileocecal valve that was removed.  This was a tubular adenoma. When patient was home after the colonoscopy he felt a strange sensation on the left side of his face.  Subsequently, this turned out to be Bell's palsy and he was treated with Medrol Dosepak.  He did see a neurologist.  Symptoms are improving but he still has some residual with numbness on the left side of the face and some weakness. He does complain of some recurrence of heartburn and dyspepsia.  This was relieved by omeprazole in the past.  He had an upper endoscopy in 2020 that revealed evidence of a hiatus hernia and H. pylori gastritis.  H. pylori was treated and eradicated.